# Patient Record
Sex: FEMALE | Race: WHITE | NOT HISPANIC OR LATINO | URBAN - METROPOLITAN AREA
[De-identification: names, ages, dates, MRNs, and addresses within clinical notes are randomized per-mention and may not be internally consistent; named-entity substitution may affect disease eponyms.]

---

## 2020-07-24 ENCOUNTER — INPATIENT (INPATIENT)
Facility: HOSPITAL | Age: 85
LOS: 5 days | Discharge: NURSING, REHAB OR LONG-TERM CARE | DRG: 481 | End: 2020-07-30
Attending: SURGERY | Admitting: SURGERY
Payer: MEDICARE

## 2020-07-24 VITALS — WEIGHT: 172.4 LBS

## 2020-07-24 DIAGNOSIS — S72.92XA UNSPECIFIED FRACTURE OF LEFT FEMUR, INITIAL ENCOUNTER FOR CLOSED FRACTURE: ICD-10-CM

## 2020-07-24 LAB
ABO RH CONFIRMATION: SIGNIFICANT CHANGE UP
ALBUMIN SERPL ELPH-MCNC: 3.2 G/DL — LOW (ref 3.3–5.2)
ALP SERPL-CCNC: 150 U/L — HIGH (ref 40–120)
ALT FLD-CCNC: 9 U/L — SIGNIFICANT CHANGE UP
AMPHET UR-MCNC: NEGATIVE — SIGNIFICANT CHANGE UP
ANION GAP SERPL CALC-SCNC: 16 MMOL/L — SIGNIFICANT CHANGE UP (ref 5–17)
APTT BLD: 30.2 SEC — SIGNIFICANT CHANGE UP (ref 27.5–35.5)
AST SERPL-CCNC: 15 U/L — SIGNIFICANT CHANGE UP
BARBITURATES UR SCN-MCNC: NEGATIVE — SIGNIFICANT CHANGE UP
BASOPHILS # BLD AUTO: 0.01 K/UL — SIGNIFICANT CHANGE UP (ref 0–0.2)
BASOPHILS NFR BLD AUTO: 0.1 % — SIGNIFICANT CHANGE UP (ref 0–2)
BENZODIAZ UR-MCNC: NEGATIVE — SIGNIFICANT CHANGE UP
BILIRUB SERPL-MCNC: 0.9 MG/DL — SIGNIFICANT CHANGE UP (ref 0.4–2)
BLD GP AB SCN SERPL QL: SIGNIFICANT CHANGE UP
BUN SERPL-MCNC: 23 MG/DL — HIGH (ref 8–20)
CALCIUM SERPL-MCNC: 8.9 MG/DL — SIGNIFICANT CHANGE UP (ref 8.6–10.2)
CHLORIDE SERPL-SCNC: 102 MMOL/L — SIGNIFICANT CHANGE UP (ref 98–107)
CO2 SERPL-SCNC: 22 MMOL/L — SIGNIFICANT CHANGE UP (ref 22–29)
COCAINE METAB.OTHER UR-MCNC: NEGATIVE — SIGNIFICANT CHANGE UP
CREAT SERPL-MCNC: 1.41 MG/DL — HIGH (ref 0.5–1.3)
EOSINOPHIL # BLD AUTO: 0.03 K/UL — SIGNIFICANT CHANGE UP (ref 0–0.5)
EOSINOPHIL NFR BLD AUTO: 0.2 % — SIGNIFICANT CHANGE UP (ref 0–6)
ETHANOL SERPL-MCNC: <10 MG/DL — SIGNIFICANT CHANGE UP
GLUCOSE SERPL-MCNC: 178 MG/DL — HIGH (ref 70–99)
HCT VFR BLD CALC: 41.6 % — SIGNIFICANT CHANGE UP (ref 34.5–45)
HGB BLD-MCNC: 13.9 G/DL — SIGNIFICANT CHANGE UP (ref 11.5–15.5)
IMM GRANULOCYTES NFR BLD AUTO: 0.4 % — SIGNIFICANT CHANGE UP (ref 0–1.5)
INR BLD: 1.02 RATIO — SIGNIFICANT CHANGE UP (ref 0.88–1.16)
LYMPHOCYTES # BLD AUTO: 1.14 K/UL — SIGNIFICANT CHANGE UP (ref 1–3.3)
LYMPHOCYTES # BLD AUTO: 8.3 % — LOW (ref 13–44)
MCHC RBC-ENTMCNC: 31.7 PG — SIGNIFICANT CHANGE UP (ref 27–34)
MCHC RBC-ENTMCNC: 33.4 GM/DL — SIGNIFICANT CHANGE UP (ref 32–36)
MCV RBC AUTO: 95 FL — SIGNIFICANT CHANGE UP (ref 80–100)
METHADONE UR-MCNC: NEGATIVE — SIGNIFICANT CHANGE UP
MONOCYTES # BLD AUTO: 0.6 K/UL — SIGNIFICANT CHANGE UP (ref 0–0.9)
MONOCYTES NFR BLD AUTO: 4.4 % — SIGNIFICANT CHANGE UP (ref 2–14)
NEUTROPHILS # BLD AUTO: 11.84 K/UL — HIGH (ref 1.8–7.4)
NEUTROPHILS NFR BLD AUTO: 86.6 % — HIGH (ref 43–77)
OPIATES UR-MCNC: NEGATIVE — SIGNIFICANT CHANGE UP
PCP SPEC-MCNC: SIGNIFICANT CHANGE UP
PCP UR-MCNC: NEGATIVE — SIGNIFICANT CHANGE UP
PLATELET # BLD AUTO: 208 K/UL — SIGNIFICANT CHANGE UP (ref 150–400)
POTASSIUM SERPL-MCNC: 3.8 MMOL/L — SIGNIFICANT CHANGE UP (ref 3.5–5.3)
POTASSIUM SERPL-SCNC: 3.8 MMOL/L — SIGNIFICANT CHANGE UP (ref 3.5–5.3)
PROT SERPL-MCNC: 5.5 G/DL — LOW (ref 6.6–8.7)
PROTHROM AB SERPL-ACNC: 11.8 SEC — SIGNIFICANT CHANGE UP (ref 10.6–13.6)
RBC # BLD: 4.38 M/UL — SIGNIFICANT CHANGE UP (ref 3.8–5.2)
RBC # FLD: 12.3 % — SIGNIFICANT CHANGE UP (ref 10.3–14.5)
SARS-COV-2 RNA SPEC QL NAA+PROBE: SIGNIFICANT CHANGE UP
SODIUM SERPL-SCNC: 140 MMOL/L — SIGNIFICANT CHANGE UP (ref 135–145)
THC UR QL: NEGATIVE — SIGNIFICANT CHANGE UP
WBC # BLD: 13.68 K/UL — HIGH (ref 3.8–10.5)
WBC # FLD AUTO: 13.68 K/UL — HIGH (ref 3.8–10.5)

## 2020-07-24 PROCEDURE — 71260 CT THORAX DX C+: CPT | Mod: 26

## 2020-07-24 PROCEDURE — 99221 1ST HOSP IP/OBS SF/LOW 40: CPT | Mod: 57

## 2020-07-24 PROCEDURE — 70450 CT HEAD/BRAIN W/O DYE: CPT | Mod: 26

## 2020-07-24 PROCEDURE — 99291 CRITICAL CARE FIRST HOUR: CPT | Mod: CS

## 2020-07-24 PROCEDURE — 71045 X-RAY EXAM CHEST 1 VIEW: CPT | Mod: 26

## 2020-07-24 PROCEDURE — 73502 X-RAY EXAM HIP UNI 2-3 VIEWS: CPT | Mod: 26,LT

## 2020-07-24 PROCEDURE — 99221 1ST HOSP IP/OBS SF/LOW 40: CPT | Mod: GC,AI

## 2020-07-24 PROCEDURE — 74177 CT ABD & PELVIS W/CONTRAST: CPT | Mod: 26

## 2020-07-24 PROCEDURE — 93010 ELECTROCARDIOGRAM REPORT: CPT

## 2020-07-24 PROCEDURE — 72125 CT NECK SPINE W/O DYE: CPT | Mod: 26

## 2020-07-24 PROCEDURE — 73552 X-RAY EXAM OF FEMUR 2/>: CPT | Mod: 26,LT

## 2020-07-24 PROCEDURE — 72170 X-RAY EXAM OF PELVIS: CPT | Mod: 26,59

## 2020-07-24 RX ORDER — CEFAZOLIN SODIUM 1 G
2000 VIAL (EA) INJECTION ONCE
Refills: 0 | Status: DISCONTINUED | OUTPATIENT
Start: 2020-07-26 | End: 2020-07-30

## 2020-07-24 RX ORDER — CLOPIDOGREL BISULFATE 75 MG/1
1 TABLET, FILM COATED ORAL
Qty: 0 | Refills: 0 | DISCHARGE

## 2020-07-24 RX ORDER — METOPROLOL TARTRATE 50 MG
1 TABLET ORAL
Qty: 0 | Refills: 0 | DISCHARGE

## 2020-07-24 RX ORDER — SIMVASTATIN 20 MG/1
1 TABLET, FILM COATED ORAL
Qty: 0 | Refills: 0 | DISCHARGE

## 2020-07-24 RX ORDER — SODIUM CHLORIDE 9 MG/ML
1000 INJECTION, SOLUTION INTRAVENOUS
Refills: 0 | Status: DISCONTINUED | OUTPATIENT
Start: 2020-07-24 | End: 2020-07-30

## 2020-07-24 RX ORDER — METOPROLOL TARTRATE 50 MG
50 TABLET ORAL EVERY 12 HOURS
Refills: 0 | Status: DISCONTINUED | OUTPATIENT
Start: 2020-07-24 | End: 2020-07-30

## 2020-07-24 RX ORDER — FUROSEMIDE 40 MG
1 TABLET ORAL
Qty: 0 | Refills: 0 | DISCHARGE

## 2020-07-24 NOTE — ED ADULT NURSE REASSESSMENT NOTE - NS ED NURSE REASSESS COMMENT FT1
Assumed pt care at this time. Pt resting comfortably in stretcher, A&Ox2 (u/k date), NAD noted, respirations even and nonlabored, NSR on monitor. Pt offers no complaints at this time. Assumed pt care at this time. Pt resting comfortably in stretcher, A&Ox2 (u/k date), NAD noted, respirations even and nonlabored, NSR on monitor. Pt offers no complaints at this time. Pt came to [facility] as Trauma B, s/t fall on plavix.

## 2020-07-24 NOTE — ED ADULT NURSE REASSESSMENT NOTE - NS ED NURSE REASSESS COMMENT FT1
spoke with daughter spoke with daughter reyna hernández [phone]. updated as to admit status, explained no visitors in ED. understood, will call [facility] after admit for further updates. spoke with daughter reyna hernández [phone]. updated as to admit status, explained no visitors in ED. understood, will call [facility] after admit for further updates. as per daughter, pt is a&ox2 at baseline, lives home with her son.

## 2020-07-24 NOTE — CONSULT NOTE ADULT - SUBJECTIVE AND OBJECTIVE BOX
Pt Name: GUSTABO DIGGS    MRN: [id number]      Patient is a 86y Female with past medical history of HTN, HLD, CAD (s/p stent) on plavix presenting to [facility] ED as code trauma status post ground level mechanical fall earlier this evening. Patient appears confused and unable to recall specifics of incident, repeatedly stating that her son would be able to answer questions. Patient does endorse left hip/groin pain. Patient denies hitting her head or LOC. Patient denies acute motor or sensory changes. Denies pain to left ankle, knee, right lower extremity or bilateral upper extremities. No additional complaints expressed.     REVIEW OF SYSTEMS    General: Alert, responsive, in NAD    Respiratory and Thorax: No difficulty breathing. No cough.  	   Cardiovascular:	No chest pain. No palpitations.    Gastrointestinal:	 No abdominal pain. No diarrhea.     Genitourinary: No dysuria. No bleeding.    Musculoskeletal: SEE HPI.    Neurological: No sensory or motor changes.     ROS is otherwise negative.    PAST MEDICAL & SURGICAL HISTORY:  PAST MEDICAL & SURGICAL HISTORY:    Allergies: No Known Allergies    Medications:     FAMILY HISTORY:  : non-contributory                            13.9   13.68 )-----------( 208      ( 24 Jul 2020 19:01 )             41.6       07-24    140  |  102  |  23.0<H>  ----------------------------<  178<H>  3.8   |  22.0  |  1.41<H>    Ca    8.9      24 Jul 2020 19:01    TPro  5.5<L>  /  Alb  3.2<L>  /  TBili  0.9  /  DBili  x   /  AST  15  /  ALT  9   /  AlkPhos  150<H>  07-24      Vital Signs Last 24 Hrs  T(C): --  T(F): --  HR: --  BP: --  BP(mean): --  RR: --  SpO2: --    Daily     Daily       PHYSICAL EXAM:      Appearance: Alert, responsive, in no acute distress.    Musculoskeletal:         Left Upper Extremity: Normal painless range of motion. No bony tenderness. No crepitus.        Right Upper Extremity: Normal painless range of motion. No bony tenderness. No crepitus.        Left Lower Extremity: shortened and externally rotated. Skin intact. No bleeding or lacerations noted. Tenderness and associated swelling noted throughout hip radiating into groin. No ecchymosis noted. Sensation to light touch grossly intact without deficit. Compartments soft supple throughout. +GC/TA/EHL/FHL. Dorsalis pedis pulse 2+. Capillary refill is less than 3 seconds.        Right Lower Extremity: Normal painless range of motion. No bony tenderness. No crepitus.     Imaging Studies: < from: Xray Hip 2-3 Views, Left (07.24.20 @ 19:49) >     EXAM:  XR HIP 2-3V LT                          PROCEDURE DATE:  07/24/2020          INTERPRETATION:  Left hip. Patient local trauma. 2 views.    There is mild left hip degeneration.    There is an intratrochanteric fracture with increased angulation at the fracture site and comminution of the greater lesser trochanters.    IMPRESSION: Fracture as above.    < end of copied text >      A/P:  Pt is a  86y Female with Left IT fracture as described above    PLAN:   * Case discussed with Dr. Gomez  * NPO after midnight for OR tomorrow pending medical/trauma/cardiology clearances  * IV fluids ordered and to start once NPO  * Pre-operative ABX ordered  * Pain control as clinically indicated  * Bed rest for now Pt Name: GUSTABO DIGGS    MRN: [id number]      Patient is a 86y Female with past medical history of HTN, HLD, CAD (s/p stent) on plavix presenting to [facility] ED as code trauma status post ground level mechanical fall earlier this evening. Patient appears confused and unable to recall specifics of incident, repeatedly stating that her son would be able to answer questions. Patient does endorse left hip/groin pain. Patient denies hitting her head or LOC. Patient denies acute motor or sensory changes. Denies pain to left ankle, knee, right lower extremity or bilateral upper extremities. No additional complaints expressed.     REVIEW OF SYSTEMS    General: Alert, responsive, in NAD    Respiratory and Thorax: No difficulty breathing. No cough.  	   Cardiovascular:	No chest pain. No palpitations.    Gastrointestinal:	 No abdominal pain. No diarrhea.     Genitourinary: No dysuria. No bleeding.    Musculoskeletal: SEE HPI.    Neurological: No sensory or motor changes.     ROS is otherwise negative.    PAST MEDICAL & SURGICAL HISTORY:  PAST MEDICAL & SURGICAL HISTORY:    Allergies: No Known Allergies    Medications:     FAMILY HISTORY:  : non-contributory                            13.9   13.68 )-----------( 208      ( 24 Jul 2020 19:01 )             41.6       07-24    140  |  102  |  23.0<H>  ----------------------------<  178<H>  3.8   |  22.0  |  1.41<H>    Ca    8.9      24 Jul 2020 19:01    TPro  5.5<L>  /  Alb  3.2<L>  /  TBili  0.9  /  DBili  x   /  AST  15  /  ALT  9   /  AlkPhos  150<H>  07-24      Vital Signs Last 24 Hrs  T(C): --  T(F): --  HR: --  BP: --  BP(mean): --  RR: --  SpO2: --    Daily     Daily       PHYSICAL EXAM:      Appearance: Alert, responsive, in no acute distress.    Musculoskeletal:         Left Upper Extremity: Normal painless range of motion. No bony tenderness. No crepitus.        Right Upper Extremity: Normal painless range of motion. No bony tenderness. No crepitus.        Left Lower Extremity: shortened and externally rotated. Skin intact. No bleeding or lacerations noted. Tenderness and associated swelling noted throughout hip radiating into groin. No ecchymosis noted. Sensation to light touch grossly intact without deficit. Compartments soft supple throughout. +GC/TA/EHL/FHL. Dorsalis pedis pulse 2+. Capillary refill is less than 3 seconds.        Right Lower Extremity: Normal painless range of motion. No bony tenderness. No crepitus.     Imaging Studies: < from: Xray Hip 2-3 Views, Left (07.24.20 @ 19:49) >     EXAM:  XR HIP 2-3V LT                          PROCEDURE DATE:  07/24/2020          INTERPRETATION:  Left hip. Patient local trauma. 2 views.    There is mild left hip degeneration.    There is an intratrochanteric fracture with increased angulation at the fracture site and comminution of the greater lesser trochanters.    IMPRESSION: Fracture as above.    < end of copied text >      A/P:  Pt is a  86y Female with Left IT fracture as described above    PLAN:   * Case discussed with Dr. Gomez. Patient needs a cephallomedullary nail  * NPO after midnight for OR tomorrow pending medical/trauma/cardiology clearances  * IV fluids ordered and to start once NPO  * Pre-operative ABX ordered  * Pain control as clinically indicated  * Bed rest for now

## 2020-07-24 NOTE — ED ADULT TRIAGE NOTE - CHIEF COMPLAINT QUOTE
Pt fell from chair at home, was on floor for ~2.5 hours, obvious deformity to left leg, on plavix, AOx3, denies hitting head, trauma B activated

## 2020-07-24 NOTE — ED PROVIDER NOTE - OBJECTIVE STATEMENT
Pt is a 87 y/o F w/PMHx CAD(stents) on plavix presents c/o fall.  Pt fell from her chair onto the floor and was down about 2.5 hours.  Pt denies hitting head or LOC. labor/delivery

## 2020-07-24 NOTE — H&P ADULT - HISTORY OF PRESENT ILLNESS
86y Female BIB EMS who had a ground level fall about three hours prior to arrival. Patient thought pain would get better over time but instead it got worse. Patient denies loss of consciousness.    Patient denies fevers/chills, denies lightheadedness/dizziness, denies SOB/chest pain, denies nausea/vomiting, denies constipation/diarrhea.  ***    A airway intact, speaking full sentences  B equal breath sounds bilaterally  C radial/DP/femoral pulses intact bilaterally   D GCS15 [id number], moving all fours, no focal deficits, pupils R 3mm reactive/L 3mm reactive  E patient fully exposed, provided warm blankets, L hip remarkable for shortened and internally rotated L hip    CXR no fracture or hemopneumothorax  FAST negative    Secondary survey remarkable for shortened and internally rotated L hip    ROS: 10-system review is otherwise negative except HPI above.      PAST MEDICAL & SURGICAL HISTORY: HTN, CAD, HLD    PSH: hysterectomy    FAMILY HISTORY:    [] Family history not pertinent as reviewed with the patient and family    SOCIAL HISTORY:  ***    ALLERGIES: No Known Allergies      HOME MEDICATIONS:   Metoprolol 50 BID  KCl 10 meq BID  Plavix 75  Simvastatin 20  Lasix 40 Qd    --------------------------------------------------------------------------------------------    PHYSICAL EXAM:   General: NAD  Neuro: A+Ox3  HEENT: NC/AT, EOMI  Cardio: RRR, nml S1/S2  Resp: Good effort, CTA b/l  Thorax: No chest wall tenderness  Breast: No lesions/masses, no drainage  GI/Abd: Soft, NT/ND, no rebound/guarding, no masses palpated  Vascular: All 4 extremities warm.  Skin: Intact, no breakdown  Pelvis: unstable, shortened and internally rotated L leg  Musculoskeletal: no spinal tenderness or stepoffs, moving all 4 extremeties  --------------------------------------------------------------------------------------------    LABS                 13.9   13.68  )----------(  208       ( 24 Jul 2020 19:01 )               41.6      140    |  102    |  23.0   ----------------------------<  178        ( 24 Jul 2020 19:01 )  3.8     |  22.0   |  1.41     Ca    8.9        ( 24 Jul 2020 19:01 )    TPro  5.5    /  Alb  3.2    /  TBili  0.9    /  DBili  x      /  AST  15     /  ALT  9      /  AlkPhos  150    ( 24 Jul 2020 19:01 )    LIVER FUNCTIONS - ( 24 Jul 2020 19:01 )  Alb: 3.2 g/dL / Pro: 5.5 g/dL / ALK PHOS: 150 U/L / ALT: 9 U/L / AST: 15 U/L / GGT: x           PT/INR -  11.8 sec / 1.02 ratio   ( 24 Jul 2020 19:01 )       PTT -  30.2 sec   ( 24 Jul 2020 19:01 )  --------------------------------------------------------------------------------------------  IMAGING  XR Pelvis:     CT C/A/P:      ASSESSMENT: Patient is a 86y old female s/p ground level fall and     PLAN:    - tertiary exam  - f/u ortho recs  - f/u CT A/P  - f/u XR pelvis  - pain control  - Patient seen/examined with attending.  - Plan to be discussed with Attending, Dr. Jaimes 86y Female BIB EMS who had a ground level fall about three hours prior to arrival. Patient thought pain would get better over time but instead it got worse. Patient denies loss of consciousness.    Patient denies fevers/chills, denies lightheadedness/dizziness, denies SOB/chest pain, denies nausea/vomiting, denies constipation/diarrhea.  ***    A airway intact, speaking full sentences  B equal breath sounds bilaterally  C radial/DP/femoral pulses intact bilaterally   D GCS15 E4V5M6, moving all fours, no focal deficits, pupils R 3mm reactive/L 3mm reactive  E patient fully exposed, provided warm blankets, L hip remarkable for shortened and internally rotated L hip    CXR no fracture or hemopneumothorax  FAST negative    Secondary survey remarkable for shortened and internally rotated L hip    ROS: 10-system review is otherwise negative except HPI above.      PAST MEDICAL & SURGICAL HISTORY: HTN, CAD, HLD    PSH: hysterectomy    FAMILY HISTORY:    [] Family history not pertinent as reviewed with the patient and family    SOCIAL HISTORY:  ***    ALLERGIES: No Known Allergies      HOME MEDICATIONS:   Metoprolol 50 BID  KCl 10 meq BID  Plavix 75  Simvastatin 20  Lasix 40 Qd    --------------------------------------------------------------------------------------------    PHYSICAL EXAM:   General: NAD  Neuro: A+Ox3  HEENT: NC/AT, EOMI  Cardio: RRR, nml S1/S2  Resp: Good effort, CTA b/l  Thorax: No chest wall tenderness  Breast: No lesions/masses, no drainage  GI/Abd: Soft, NT/ND, no rebound/guarding, no masses palpated  Vascular: All 4 extremities warm.  Skin: Intact, no breakdown  Pelvis: unstable, shortened and internally rotated L leg  Musculoskeletal: no spinal tenderness or stepoffs, moving all 4 extremities  : fecal soiling in underwear  --------------------------------------------------------------------------------------------    LABS                 13.9   13.68  )----------(  208       ( 24 Jul 2020 19:01 )               41.6      140    |  102    |  23.0   ----------------------------<  178        ( 24 Jul 2020 19:01 )  3.8     |  22.0   |  1.41     Ca    8.9        ( 24 Jul 2020 19:01 )    TPro  5.5    /  Alb  3.2    /  TBili  0.9    /  DBili  x      /  AST  15     /  ALT  9      /  AlkPhos  150    ( 24 Jul 2020 19:01 )    LIVER FUNCTIONS - ( 24 Jul 2020 19:01 )  Alb: 3.2 g/dL / Pro: 5.5 g/dL / ALK PHOS: 150 U/L / ALT: 9 U/L / AST: 15 U/L / GGT: x           PT/INR -  11.8 sec / 1.02 ratio   ( 24 Jul 2020 19:01 )       PTT -  30.2 sec   ( 24 Jul 2020 19:01 )  --------------------------------------------------------------------------------------------  IMAGING  XR Pelvis:     CT C/A/P:      ASSESSMENT: Patient is a 86y old female s/p ground level fall and     PLAN:    - tertiary exam  - f/u ortho recs  - f/u CT A/P  - f/u XR pelvis  - pain control  - Patient seen/examined with attending.  - Plan to be discussed with Attending, Dr. Jaimes 86y Female BIB EMS who had a ground level fall about three hours prior to arrival. Patient thought pain would get better over time but instead it got worse. Patient denies loss of consciousness.    Patient denies fevers/chills, denies lightheadedness/dizziness, denies SOB/chest pain, denies nausea/vomiting, denies constipation/diarrhea.     A airway intact, speaking full sentences  B equal breath sounds bilaterally  C radial/DP/femoral pulses intact bilaterally   D GCS15 E4V5M6, moving all fours, no focal deficits, pupils R 3mm reactive/L 3mm reactive  E patient fully exposed, provided warm blankets, L hip remarkable for shortened and externally rotated L hip    CXR no fracture or hemopneumothorax  FAST negative    Secondary survey remarkable for shortened and externally rotated L hip    ROS: 10-system review is otherwise negative except HPI above.      PAST MEDICAL & SURGICAL HISTORY: HTN, CAD, HLD    PSH: hysterectomy    FAMILY HISTORY:    [] Family history not pertinent as reviewed with the patient and family    SOCIAL HISTORY:  ***    ALLERGIES: No Known Allergies      HOME MEDICATIONS:   Metoprolol 50 BID  KCl 10 meq BID  Plavix 75  Simvastatin 20  Lasix 40 Qd    --------------------------------------------------------------------------------------------    PHYSICAL EXAM:   General: NAD  Neuro: A+Ox3  HEENT: NC/AT, EOMI  Cardio: RRR, nml S1/S2  Resp: Good effort, CTA b/l  Thorax: No chest wall tenderness  Breast: No lesions/masses, no drainage  GI/Abd: Soft, NT/ND, no rebound/guarding, no masses palpated  Vascular: All 4 extremities warm.  Skin: Intact, no breakdown  Pelvis: unstable, shortened and externally rotated L leg  Musculoskeletal: no spinal tenderness or stepoffs, moving all 4 extremities  : fecal soiling in underwear  --------------------------------------------------------------------------------------------    LABS                 13.9   13.68  )----------(  208       ( 24 Jul 2020 19:01 )               41.6      140    |  102    |  23.0   ----------------------------<  178        ( 24 Jul 2020 19:01 )  3.8     |  22.0   |  1.41     Ca    8.9        ( 24 Jul 2020 19:01 )    TPro  5.5    /  Alb  3.2    /  TBili  0.9    /  DBili  x      /  AST  15     /  ALT  9      /  AlkPhos  150    ( 24 Jul 2020 19:01 )    LIVER FUNCTIONS - ( 24 Jul 2020 19:01 )  Alb: 3.2 g/dL / Pro: 5.5 g/dL / ALK PHOS: 150 U/L / ALT: 9 U/L / AST: 15 U/L / GGT: x           PT/INR -  11.8 sec / 1.02 ratio   ( 24 Jul 2020 19:01 )       PTT -  30.2 sec   ( 24 Jul 2020 19:01 )  --------------------------------------------------------------------------------------------  IMAGING  XR Pelvis:     CT C/A/P:      ASSESSMENT: Patient is a 86y old female s/p ground level fall and     PLAN:    - tertiary exam  - f/u ortho recs  - f/u CT A/P  - f/u XR pelvis  - pain control  - Patient seen/examined with attending.  - Plan to be discussed with Attending, Dr. Jaimes

## 2020-07-24 NOTE — PROGRESS NOTE ADULT - SUBJECTIVE AND OBJECTIVE BOX
Tertiary Trauma Survey (TTS)    Date of TTS: 07-24-20 @ 23:19                             Admit Date: 07-24-20 @ 19:43      Trauma Activation:B      Subjective / 24 hour events: Patient was examined and seen at bedside. She is lying uncomfortably and was in mild distress due to her L hip fx. Patient denies acute onset of pain elsewhere.     Vital Signs Last 24 Hrs  T(C): 36.8 (24 Jul 2020 22:25), Max: 36.8 (24 Jul 2020 22:25)  T(F): 98.3 (24 Jul 2020 22:25), Max: 98.3 (24 Jul 2020 22:25)  HR: 103 (24 Jul 2020 22:25) (103 - 103)  BP: 112/65 (24 Jul 2020 22:25) (112/65 - 112/65)  BP(mean): --  RR: 18 (24 Jul 2020 22:25) (18 - 18)  SpO2: 100% (24 Jul 2020 22:25) (100% - 100%)    Physical Exam:    Neuro: [ ] non focal neurological exam [ ] Focal Neurological deficits noted to be:     HEENT: [ ] Normo-cephalic/atraumatic  [ ] abnormalities noted to be:    Pulm/Chest:  [ ] CTA b/l  [ ] chest wall non tender  [ ] abnormalities noted to be:    Cardiac: [ ] S1S2, sinus rhythm  [ ] abnormalities noted to be:     GI / Abdomen: [ ] Soft, non-tender, non-distended [ ] abnormalities noted to be:    Musculoskeletal / Extremities: [ ]normal active ROM  [ ]  abnormalities noted to be:    Integumentary: [ ] Skin intact [ ] Warm [ ] Dry [ ]abnormalities noted to be:    Vascular: [ ] 2+ palpable distal pulses  [ ] SHAY:       [ ] abnormalities noted to be:      List Injuries Identified to Date:    List Operative and Interventional Radiological Procedures:     Consults (Date):  [x] Orthopedics      RADIOLOGICAL FINDINGS REVIEW:  CXR:    Pelvis Films:     C-Spine Films:    T/L/S Spine Films:    Extremity Films:    Head CT:    C-Spine CT:    Neck CT:    Chest CT:    ABD/Pelvis CT:    Other:

## 2020-07-24 NOTE — ED PROVIDER NOTE - ATTENDING CONTRIBUTION TO CARE
I performed a face to face history and physical exam of the patient and discussed their management with the resident/ACP. I reviewed the resident/ACP's note and agree with the documented findings and plan of care.    Pt with fall at home and complaining of L hip pain from the fall. uncertain if she hit her head.  trauma B activation.    physical - tachy irregular.  ctab abd - soft, nt. no edema. no rash.    plan - labs and imaging reviewed.  Pt with L hip fx. ortho consulted. trauma to admit.

## 2020-07-24 NOTE — H&P ADULT - ATTENDING COMMENTS
Pt seen and examined by me  agree with findings  outward signs neglect  IT fx  Head, C spine CT neg for acute injury  CT C/A/P-thyroid nodule, pneumobilia (prior cholecystectomy), mass concerning in rectal vault  will need further eval for incidental findings

## 2020-07-25 ENCOUNTER — TRANSCRIPTION ENCOUNTER (OUTPATIENT)
Age: 85
End: 2020-07-25

## 2020-07-25 DIAGNOSIS — R93.3 ABNORMAL FINDINGS ON DIAGNOSTIC IMAGING OF OTHER PARTS OF DIGESTIVE TRACT: ICD-10-CM

## 2020-07-25 DIAGNOSIS — S72.92XA UNSPECIFIED FRACTURE OF LEFT FEMUR, INITIAL ENCOUNTER FOR CLOSED FRACTURE: ICD-10-CM

## 2020-07-25 LAB
ANION GAP SERPL CALC-SCNC: 16 MMOL/L — SIGNIFICANT CHANGE UP (ref 5–17)
BASOPHILS # BLD AUTO: 0.02 K/UL — SIGNIFICANT CHANGE UP (ref 0–0.2)
BASOPHILS NFR BLD AUTO: 0.2 % — SIGNIFICANT CHANGE UP (ref 0–2)
BUN SERPL-MCNC: 25 MG/DL — HIGH (ref 8–20)
CALCIUM SERPL-MCNC: 8.4 MG/DL — LOW (ref 8.6–10.2)
CHLORIDE SERPL-SCNC: 103 MMOL/L — SIGNIFICANT CHANGE UP (ref 98–107)
CO2 SERPL-SCNC: 19 MMOL/L — LOW (ref 22–29)
CREAT SERPL-MCNC: 1.18 MG/DL — SIGNIFICANT CHANGE UP (ref 0.5–1.3)
EOSINOPHIL # BLD AUTO: 0 K/UL — SIGNIFICANT CHANGE UP (ref 0–0.5)
EOSINOPHIL NFR BLD AUTO: 0 % — SIGNIFICANT CHANGE UP (ref 0–6)
GLUCOSE SERPL-MCNC: 190 MG/DL — HIGH (ref 70–99)
HCT VFR BLD CALC: 33.8 % — LOW (ref 34.5–45)
HGB BLD-MCNC: 11.2 G/DL — LOW (ref 11.5–15.5)
IMM GRANULOCYTES NFR BLD AUTO: 0.6 % — SIGNIFICANT CHANGE UP (ref 0–1.5)
LYMPHOCYTES # BLD AUTO: 1.14 K/UL — SIGNIFICANT CHANGE UP (ref 1–3.3)
LYMPHOCYTES # BLD AUTO: 8.6 % — LOW (ref 13–44)
MAGNESIUM SERPL-MCNC: 1.9 MG/DL — SIGNIFICANT CHANGE UP (ref 1.6–2.6)
MCHC RBC-ENTMCNC: 31.3 PG — SIGNIFICANT CHANGE UP (ref 27–34)
MCHC RBC-ENTMCNC: 33.1 GM/DL — SIGNIFICANT CHANGE UP (ref 32–36)
MCV RBC AUTO: 94.4 FL — SIGNIFICANT CHANGE UP (ref 80–100)
MONOCYTES # BLD AUTO: 0.81 K/UL — SIGNIFICANT CHANGE UP (ref 0–0.9)
MONOCYTES NFR BLD AUTO: 6.1 % — SIGNIFICANT CHANGE UP (ref 2–14)
NEUTROPHILS # BLD AUTO: 11.27 K/UL — HIGH (ref 1.8–7.4)
NEUTROPHILS NFR BLD AUTO: 84.5 % — HIGH (ref 43–77)
NT-PROBNP SERPL-SCNC: 2827 PG/ML — HIGH (ref 0–300)
PLATELET # BLD AUTO: 181 K/UL — SIGNIFICANT CHANGE UP (ref 150–400)
POTASSIUM SERPL-MCNC: 3.8 MMOL/L — SIGNIFICANT CHANGE UP (ref 3.5–5.3)
POTASSIUM SERPL-SCNC: 3.8 MMOL/L — SIGNIFICANT CHANGE UP (ref 3.5–5.3)
RBC # BLD: 3.58 M/UL — LOW (ref 3.8–5.2)
RBC # FLD: 12.7 % — SIGNIFICANT CHANGE UP (ref 10.3–14.5)
SARS-COV-2 IGG SERPL QL IA: NEGATIVE — SIGNIFICANT CHANGE UP
SARS-COV-2 IGM SERPL IA-ACNC: <0.1 INDEX — SIGNIFICANT CHANGE UP
SODIUM SERPL-SCNC: 138 MMOL/L — SIGNIFICANT CHANGE UP (ref 135–145)
T3 SERPL-MCNC: 67 NG/DL — LOW (ref 80–200)
T4 AB SER-ACNC: 5.4 UG/DL — SIGNIFICANT CHANGE UP (ref 4.5–12)
TROPONIN T SERPL-MCNC: <0.01 NG/ML — SIGNIFICANT CHANGE UP (ref 0–0.06)
TSH SERPL-MCNC: 0.93 UIU/ML — SIGNIFICANT CHANGE UP (ref 0.27–4.2)
WBC # BLD: 13.32 K/UL — HIGH (ref 3.8–10.5)
WBC # FLD AUTO: 13.32 K/UL — HIGH (ref 3.8–10.5)

## 2020-07-25 PROCEDURE — 99223 1ST HOSP IP/OBS HIGH 75: CPT

## 2020-07-25 PROCEDURE — 93306 TTE W/DOPPLER COMPLETE: CPT | Mod: 26

## 2020-07-25 PROCEDURE — 99233 SBSQ HOSP IP/OBS HIGH 50: CPT

## 2020-07-25 PROCEDURE — 99231 SBSQ HOSP IP/OBS SF/LOW 25: CPT

## 2020-07-25 RX ORDER — SENNA PLUS 8.6 MG/1
2 TABLET ORAL AT BEDTIME
Refills: 0 | Status: DISCONTINUED | OUTPATIENT
Start: 2020-07-25 | End: 2020-07-30

## 2020-07-25 RX ORDER — METOPROLOL TARTRATE 50 MG
5 TABLET ORAL ONCE
Refills: 0 | Status: COMPLETED | OUTPATIENT
Start: 2020-07-25 | End: 2020-07-25

## 2020-07-25 RX ORDER — ENOXAPARIN SODIUM 100 MG/ML
40 INJECTION SUBCUTANEOUS DAILY
Refills: 0 | Status: DISCONTINUED | OUTPATIENT
Start: 2020-07-25 | End: 2020-07-26

## 2020-07-25 RX ORDER — POLYETHYLENE GLYCOL 3350 17 G/17G
17 POWDER, FOR SOLUTION ORAL DAILY
Refills: 0 | Status: DISCONTINUED | OUTPATIENT
Start: 2020-07-25 | End: 2020-07-30

## 2020-07-25 RX ORDER — CLOPIDOGREL BISULFATE 75 MG/1
75 TABLET, FILM COATED ORAL DAILY
Refills: 0 | Status: DISCONTINUED | OUTPATIENT
Start: 2020-07-25 | End: 2020-07-25

## 2020-07-25 RX ORDER — POTASSIUM CHLORIDE 20 MEQ
20 PACKET (EA) ORAL ONCE
Refills: 0 | Status: COMPLETED | OUTPATIENT
Start: 2020-07-25 | End: 2020-07-25

## 2020-07-25 RX ORDER — HYDROMORPHONE HYDROCHLORIDE 2 MG/ML
0.5 INJECTION INTRAMUSCULAR; INTRAVENOUS; SUBCUTANEOUS EVERY 4 HOURS
Refills: 0 | Status: DISCONTINUED | OUTPATIENT
Start: 2020-07-25 | End: 2020-07-26

## 2020-07-25 RX ORDER — ACETAMINOPHEN 500 MG
650 TABLET ORAL EVERY 6 HOURS
Refills: 0 | Status: DISCONTINUED | OUTPATIENT
Start: 2020-07-25 | End: 2020-07-28

## 2020-07-25 RX ORDER — METOPROLOL TARTRATE 50 MG
25 TABLET ORAL ONCE
Refills: 0 | Status: COMPLETED | OUTPATIENT
Start: 2020-07-25 | End: 2020-07-25

## 2020-07-25 RX ORDER — ONDANSETRON 8 MG/1
4 TABLET, FILM COATED ORAL EVERY 6 HOURS
Refills: 0 | Status: DISCONTINUED | OUTPATIENT
Start: 2020-07-25 | End: 2020-07-30

## 2020-07-25 RX ORDER — SODIUM CHLORIDE 9 MG/ML
250 INJECTION INTRAMUSCULAR; INTRAVENOUS; SUBCUTANEOUS ONCE
Refills: 0 | Status: COMPLETED | OUTPATIENT
Start: 2020-07-25 | End: 2020-07-25

## 2020-07-25 RX ADMIN — Medication 650 MILLIGRAM(S): at 18:43

## 2020-07-25 RX ADMIN — Medication 650 MILLIGRAM(S): at 05:22

## 2020-07-25 RX ADMIN — Medication 650 MILLIGRAM(S): at 17:58

## 2020-07-25 RX ADMIN — Medication 5 MILLIGRAM(S): at 01:36

## 2020-07-25 RX ADMIN — Medication 20 MILLIEQUIVALENT(S): at 17:58

## 2020-07-25 RX ADMIN — SODIUM CHLORIDE 250 MILLILITER(S): 9 INJECTION INTRAMUSCULAR; INTRAVENOUS; SUBCUTANEOUS at 13:34

## 2020-07-25 RX ADMIN — Medication 50 MILLIGRAM(S): at 05:22

## 2020-07-25 RX ADMIN — Medication 650 MILLIGRAM(S): at 14:12

## 2020-07-25 RX ADMIN — Medication 650 MILLIGRAM(S): at 05:24

## 2020-07-25 RX ADMIN — Medication 25 MILLIGRAM(S): at 01:36

## 2020-07-25 RX ADMIN — Medication 650 MILLIGRAM(S): at 13:35

## 2020-07-25 RX ADMIN — Medication 50 MILLIGRAM(S): at 17:58

## 2020-07-25 RX ADMIN — ENOXAPARIN SODIUM 40 MILLIGRAM(S): 100 INJECTION SUBCUTANEOUS at 17:58

## 2020-07-25 NOTE — CONSULT NOTE ADULT - ASSESSMENT
Pt is a 85 y/o female with medical history of HTN, HLD, CAD s/p stent on Plavix, dementia, who presents to [facility]-ED s/p fall. Pt is cognitively impaired and could not contribute to HPI. Attempted to call pt son (POA) who lives in first floor apartment with mother- no answer, called pt daughter Ginny who stays upstairs from patient. Ginny states that as per brother, he was in the kitchen when he heard patient fall onto coffee table. Pt did not have LOC when found. Pt states that she was in the living room and tried to stand up and she fell hitting the back of her head. Pt was BIBA to [facility]-ED where CT head showed no acute bleeding, CT Chest- Comminuted left intertrochanteric fracture with displaced fragments. No acute intrathoracic or abdominal injury visualized.   Lobular wall thickening in the distal sigmoid/upper rectum which is highly concerning for neoplasm, Xray- intratrochanteric fracture with increased angulation at the fracture site and comminution of the greater lesser trochanters. Plan for surgical intervention of hip today. Upon assessment, pt presents with confusion (which is baseline according to pt daughter). Pt denies chest pain, palpitations, SOB, N/V/D, fevers, chills, cough, dizziness.       Cardiac Risk Stratification  -ECG-Afib HR @ 119 with occasional PVCs  -Echo-pending reading  -On tele, pt Afib seems mostly rate controlled with current medications  -Awaiting results of echo  -Without knowing pt present EF, RCRI risk score -1, Class II Risk, 0.9% Risk of Major Cardiac Event  -Pt is moderate risk of cardiac complication for intermediate risk surgery  - If No active chest pain, evidence of ischemia, decompensated CHF, significant valvular abnormality, or unstable arrhythmia, then there no absolute cardiac contraindication to the planned surgical procedure- PENDING ECHO RESULT    Preliminary evaluation, please await complete evaluation by Dr. Pack Pt is a 87 y/o female with medical history of HTN, HLD, CAD s/p stent on Plavix, dementia, who presents to [facility]-ED s/p fall. Pt is cognitively impaired and could not contribute to HPI. Attempted to call pt son (POA) who lives in first floor apartment with mother- no answer, called pt daughter Ginny who stays upstairs from patient. Ginny states that as per brother, he was in the kitchen when he heard patient fall onto coffee table. Pt did not have LOC when found. Pt states that she was in the living room and tried to stand up and she fell hitting the back of her head. Pt was BIBA to [facility]-ED where CT head showed no acute bleeding, CT Chest- Comminuted left intertrochanteric fracture with displaced fragments. No acute intrathoracic or abdominal injury visualized.   Lobular wall thickening in the distal sigmoid/upper rectum which is highly concerning for neoplasm, Xray- intratrochanteric fracture with increased angulation at the fracture site and comminution of the greater lesser trochanters. Plan for surgical intervention of hip today. Upon assessment, pt presents with confusion (which is baseline according to pt daughter). Pt denies chest pain, palpitations, SOB, N/V/D, fevers, chills, cough, dizziness.       Cardiac Risk Stratification  -ECG-Afib HR @ 119 with occasional PVCs  -On tele, pt Afib seems mostly rate controlled with current medications  -Echo- EF 60-70%, grade I diastolic dysfx., severely enlarged left atirum, moderate TR  -Without knowing pt present EF, RCRI risk score -1, Class II Risk, 0.9% Risk of Major Cardiac Event  -Pt is moderate risk of cardiac complication for intermediate risk surgery  -STAT Trop and BNP  -250cc saline bolus  - IF results of Trop, BNP are WNL, If No active chest pain, evidence of ischemia, decompensated CHF, significant valvular abnormality, or unstable arrhythmia, then there no absolute cardiac contraindication to the planned surgical procedure  -Please have patient f/u with PCP concerning adrenal and thyroid nodule

## 2020-07-25 NOTE — PROGRESS NOTE ADULT - SUBJECTIVE AND OBJECTIVE BOX
INTERVAL HPI/OVERNIGHT EVENTS:  Patient was seen and examined at bedside. Patient is lying in mild distress due to L intertrochanteric fx. Patient is aware that she is in a hospital but unsure of which hospital she is at. Patient is also unsure about her medical history and patient's son was unable to be reached by phone so will try again in the morning. When prompted again regarding the nature of her injury, patient was unable to recollect how she fell. Patient denies n/v, cp/sob, fevers, chills.    MEDICATIONS  (STANDING):  lactated ringers. 1000 milliLiter(s) (75 mL/Hr) IV Continuous <Continuous>  metoprolol tartrate 50 milliGRAM(s) Oral every 12 hours    MEDICATIONS  (PRN):      Vital Signs Last 24 Hrs  T(C): 36.8 (24 Jul 2020 22:25), Max: 36.8 (24 Jul 2020 22:25)  T(F): 98.3 (24 Jul 2020 22:25), Max: 98.3 (24 Jul 2020 22:25)  HR: 103 (24 Jul 2020 22:25) (103 - 103)  BP: 112/65 (24 Jul 2020 22:25) (112/65 - 112/65)  BP(mean): --  RR: 18 (24 Jul 2020 22:25) (18 - 18)  SpO2: 100% (24 Jul 2020 22:25) (100% - 100%)    Physical Exam:  Gen: mildly in distress due to L intertrochanteric fx, lying in bed  Respiratory: Breath Sounds equal & CTA bilaterally, no accessory muscle use  Cardiovascular: Regular rate & rhythm, normal S1, S2  Gastrointestinal: Soft, non-tender, nondistended  Vascular: Equal and normal pulses: 2+ peripheral pulses throughout  Musculoskeletal: L hip shorter and internally rotated  ECHO: normal sphincter tone, no gross blood observed, no masses palpated      I&O's Detail      LABS:                        13.9   13.68 )-----------( 208      ( 24 Jul 2020 19:01 )             41.6     07-24    140  |  102  |  23.0<H>  ----------------------------<  178<H>  3.8   |  22.0  |  1.41<H>    Ca    8.9      24 Jul 2020 19:01    TPro  5.5<L>  /  Alb  3.2<L>  /  TBili  0.9  /  DBili  x   /  AST  15  /  ALT  9   /  AlkPhos  150<H>  07-24    PT/INR - ( 24 Jul 2020 19:01 )   PT: 11.8 sec;   INR: 1.02 ratio         PTT - ( 24 Jul 2020 19:01 )  PTT:30.2 sec      Assessment;  Patient is an 87 yo F who presented to the ED s/p ground level   fall.    Plan:  - f/u ortho recs  - f/u cardiology recs/clearance for OR 7/25  - pain control  - f/u cancer markers INTERVAL HPI/OVERNIGHT EVENTS:  Patient was seen and examined at bedside. Patient is lying in mild distress due to L intertrochanteric fx. Patient is aware that she is in a hospital but unsure of which hospital she is at. Patient is also unsure about her medical history and patient's son was unable to be reached by phone so will try again in the morning. When prompted again regarding the nature of her injury, patient was unable to recollect how she fell. Patient denies n/v, cp/sob, fevers, chills.    MEDICATIONS  (STANDING):  lactated ringers. 1000 milliLiter(s) (75 mL/Hr) IV Continuous <Continuous>  metoprolol tartrate 50 milliGRAM(s) Oral every 12 hours    MEDICATIONS  (PRN):      Vital Signs Last 24 Hrs  T(C): 36.8 (24 Jul 2020 22:25), Max: 36.8 (24 Jul 2020 22:25)  T(F): 98.3 (24 Jul 2020 22:25), Max: 98.3 (24 Jul 2020 22:25)  HR: 103 (24 Jul 2020 22:25) (103 - 103)  BP: 112/65 (24 Jul 2020 22:25) (112/65 - 112/65)  BP(mean): --  RR: 18 (24 Jul 2020 22:25) (18 - 18)  SpO2: 100% (24 Jul 2020 22:25) (100% - 100%)    Physical Exam:  Gen: mildly in distress due to L intertrochanteric fx, lying in bed  Respiratory: Breath Sounds equal & CTA bilaterally, no accessory muscle use  Cardiovascular: Regular rate & rhythm, normal S1, S2  Gastrointestinal: Soft, non-tender, nondistended  Vascular: Equal and normal pulses: 2+ peripheral pulses throughout  Musculoskeletal: L hip shorter and externally rotated  ECHO: normal sphincter tone, no gross blood observed, no masses palpated      I&O's Detail      LABS:                        13.9   13.68 )-----------( 208      ( 24 Jul 2020 19:01 )             41.6     07-24    140  |  102  |  23.0<H>  ----------------------------<  178<H>  3.8   |  22.0  |  1.41<H>    Ca    8.9      24 Jul 2020 19:01    TPro  5.5<L>  /  Alb  3.2<L>  /  TBili  0.9  /  DBili  x   /  AST  15  /  ALT  9   /  AlkPhos  150<H>  07-24    PT/INR - ( 24 Jul 2020 19:01 )   PT: 11.8 sec;   INR: 1.02 ratio         PTT - ( 24 Jul 2020 19:01 )  PTT:30.2 sec      Assessment;  Patient is an 87 yo F who presented to the ED s/p ground level   fall.    Plan:  - f/u ortho recs  - f/u cardiology recs/clearance for OR 7/25  - pain control  - f/u cancer markers

## 2020-07-25 NOTE — CONSULT NOTE ADULT - SUBJECTIVE AND OBJECTIVE BOX
Patient is a 86y old  Female who presents with a chief complaint of ground level fall, obvious deformity in L hip (25 Jul 2020 00:43)      HPI:  86y Female BIB EMS who had a ground level fall about three hours prior to arrival. Patient thought pain would get better over time but instead it got worse. Patient denies loss of consciousness.  Patient denies fevers/chills, denies lightheadedness/dizziness, denies SOB/chest pain, denies nausea/vomiting, denies constipation/diarrhea.  Pt. confused and appears demented therefor history is unreliable.    ROS: Unreliable in this pt. given her probable dementia    A airway intact, speaking full sentences  B equal breath sounds bilaterally  C radial/DP/femoral pulses intact bilaterally   D GCS15 [id number], moving all fours, no focal deficits, pupils R 3mm reactive/L 3mm reactive  E patient fully exposed, provided warm blankets, L hip remarkable for shortened and externally rotated L hip    CXR no fracture or hemopneumothorax  FAST negative    Secondary survey remarkable for shortened and externally rotated L hip    ROS: 10-system review is otherwise negative except HPI above.      PAST MEDICAL & SURGICAL HISTORY: HTN, CAD, HLD    PSH: hysterectomy    FAMILY HISTORY:    [] Family history not pertinent as reviewed with the patient and family    SOCIAL HISTORY: Unknown smoking, ETOH or drug abuse history    ALLERGIES: No Known Allergies      HOME MEDICATIONS:   Metoprolol 50 BID  KCl 10 meq BID  Plavix 75  Simvastatin 20  Lasix 40 Qd    --------------------------------------------------------------------------------------------    PHYSICAL EXAM:   General: NAD  Neuro: A+Ox3  HEENT: NC/AT, EOMI  Cardio: RRR, nml S1/S2  Resp: Good effort, CTA b/l  Thorax: No chest wall tenderness  Breast: No lesions/masses, no drainage  GI/Abd: Soft, NT/ND, no rebound/guarding, no masses palpated  Vascular: All 4 extremities warm.  Skin: Intact, no breakdown  Pelvis: unstable, shortened and externally rotated L leg  Musculoskeletal: no spinal tenderness or stepoffs, moving all 4 extremities  : fecal soiling in underwear  ECHO: Pt. uncooperative and in pain from left hip fracture  --------------------------------------------------------------------------------------------    LABS                 13.9   13.68  )----------(  208       ( 24 Jul 2020 19:01 )               41.6      140    |  102    |  23.0   ----------------------------<  178        ( 24 Jul 2020 19:01 )  3.8     |  22.0   |  1.41     Ca    8.9        ( 24 Jul 2020 19:01 )    TPro  5.5    /  Alb  3.2    /  TBili  0.9    /  DBili  x      /  AST  15     /  ALT  9      /  AlkPhos  150    ( 24 Jul 2020 19:01 )    LIVER FUNCTIONS - ( 24 Jul 2020 19:01 )  Alb: 3.2 g/dL / Pro: 5.5 g/dL / ALK PHOS: 150 U/L / ALT: 9 U/L / AST: 15 U/L / GGT: x           PT/INR -  11.8 sec / 1.02 ratio   ( 24 Jul 2020 19:01 )       PTT -  30.2 sec   ( 24 Jul 2020 19:01 )  --------------------------------------------------------------------------------------------  IMAGING  XR Pelvis:     CT C/A/P: Recto-sigmoid mass measuring 5.2 cm. in length L hip fracture      ASSESSMENT: Patient is a 86y old female s/p ground level fall and     PLAN:    - tertiary exam  - f/u ortho recs  - f/u CT A/P  - f/u XR pelvis  - pain control  - Patient seen/examined with attending.  - Plan to be discussed with Attending, Dr. Jaimes (24 Jul 2020 19:26  - Eventual colonoscopy once left hip fracture stabilized.      FAMILY HISTORY: Unknown      SOCIAL HISTORY:  Smoking Status: Unknown  Pack Years: Unknown. Unknown ETOH or drug abuse history    MEDICATIONS:  MEDICATIONS  (STANDING):  acetaminophen   Tablet .. 650 milliGRAM(s) Oral every 6 hours  lactated ringers. 1000 milliLiter(s) (75 mL/Hr) IV Continuous <Continuous>  metoprolol tartrate 50 milliGRAM(s) Oral every 12 hours  polyethylene glycol 3350 17 Gram(s) Oral daily  senna 2 Tablet(s) Oral at bedtime    MEDICATIONS  (PRN):  HYDROmorphone  Injectable 0.5 milliGRAM(s) IV Push every 4 hours PRN Moderate Pain/Severe Pain (4 - 10)  ondansetron Injectable 4 milliGRAM(s) IV Push every 6 hours PRN Nausea and/or Vomiting      Allergies    No Known Allergies    Intolerances        Vital Signs Last 24 Hrs  T(C): 36.5 (25 Jul 2020 04:54), Max: 36.8 (24 Jul 2020 22:25)  T(F): 97.7 (25 Jul 2020 04:54), Max: 98.3 (24 Jul 2020 22:25)  HR: 89 (25 Jul 2020 04:54) (89 - 135)  BP: 122/74 (25 Jul 2020 04:54) (112/65 - 122/74)  BP(mean): --  RR: 18 (25 Jul 2020 04:54) (18 - 21)  SpO2: 96% (25 Jul 2020 04:54) (96% - 100%)    07-24 @ 07:01  -  07-25 @ 07:00  --------------------------------------------------------  IN: 525 mL / OUT: 150 mL / NET: 375 mL      LABS:                        13.9   13.68 )-----------( 208      ( 24 Jul 2020 19:01 )             41.6     07-24    140  |  102  |  23.0<H>  ----------------------------<  178<H>  3.8   |  22.0  |  1.41<H>    Ca    8.9      24 Jul 2020 19:01    TPro  5.5<L>  /  Alb  3.2<L>  /  TBili  0.9  /  DBili  x   /  AST  15  /  ALT  9   /  AlkPhos  150<H>  07-24          RADIOLOGY & ADDITIONAL STUDIES:

## 2020-07-25 NOTE — PROGRESS NOTE ADULT - SUBJECTIVE AND OBJECTIVE BOX
[facility]    [id number]    History:  The patient is status post fall sustaining a left intertrochanteric hip fracture.  Surgical intervention is planned for tomorrow/ Sunday 7/26. The patient's pain is controlled using the prescribed pain medications.  Denies nausea, vomiting, chest pain, shortness of breath, abdominal pain or fever. No new complaints. No acute motor or sensory changes are reported.    Vital Signs Last 24 Hrs  T(C): 36.4 (25 Jul 2020 15:47), Max: 36.8 (24 Jul 2020 22:25)  T(F): 97.5 (25 Jul 2020 15:47), Max: 98.3 (24 Jul 2020 22:25)  HR: 92 (25 Jul 2020 15:47) (85 - 135)  BP: 112/66 (25 Jul 2020 15:47) (98/62 - 122/74)  BP(mean): --  RR: 18 (25 Jul 2020 15:47) (18 - 21)  SpO2: 97% (25 Jul 2020 15:47) (94% - 100%)  I&O's Summary    24 Jul 2020 07:01  -  25 Jul 2020 07:00  --------------------------------------------------------  IN: 525 mL / OUT: 150 mL / NET: 375 mL                              11.2   13.32 )-----------( 181      ( 25 Jul 2020 08:17 )             33.8     07-25    138  |  103  |  25.0<H>  ----------------------------<  190<H>  3.8   |  19.0<L>  |  1.18    Ca    8.4<L>      25 Jul 2020 08:17  Mg     1.9     07-25    TPro  5.5<L>  /  Alb  3.2<L>  /  TBili  0.9  /  DBili  x   /  AST  15  /  ALT  9   /  AlkPhos  150<H>  07-24      MEDICATIONS  (STANDING):  acetaminophen   Tablet .. 650 milliGRAM(s) Oral every 6 hours  enoxaparin Injectable 40 milliGRAM(s) SubCutaneous daily  lactated ringers. 1000 milliLiter(s) (75 mL/Hr) IV Continuous <Continuous>  metoprolol tartrate 50 milliGRAM(s) Oral every 12 hours  polyethylene glycol 3350 17 Gram(s) Oral daily  potassium chloride    Tablet ER 20 milliEquivalent(s) Oral once  senna 2 Tablet(s) Oral at bedtime    MEDICATIONS  (PRN):  HYDROmorphone  Injectable 0.5 milliGRAM(s) IV Push every 4 hours PRN Moderate Pain/Severe Pain (4 - 10)  ondansetron Injectable 4 milliGRAM(s) IV Push every 6 hours PRN Nausea and/or Vomiting      Physical exam:  Sensation to light touch is grossly intact without focal deficit and is symmetric bilaterally. No calf tenderness. Sensation to light touch is grossly intact distally. Motor function distally is 5/5. No foot drop. 2+ dorsalis pedis pulse. Capillary refill is less than 2 seconds. No cyanosis.    Primary Orthopedic Assessment:  •   Secondary  Orthopedic Assessment(s):   •   Secondary  Medical Assessment(s):   •   Plan:   • DVT prophylaxis as prescribed, including use of compression devices and ankle pumps  •Surgical intervention planned 7/26 for Left hip IM nail  • Pain control as clinically indicated  • Incentive spirometry encouraged  NPO past midnight

## 2020-07-25 NOTE — CONSULT NOTE ADULT - PROBLEM SELECTOR RECOMMENDATION 9
Management as per Orthopedics. Eventual colonoscopy when left hip fracture stabilized and orthopedic and medical condition optimized.

## 2020-07-25 NOTE — CONSULT NOTE ADULT - SUBJECTIVE AND OBJECTIVE BOX
[facility]                                                               Office:  [address] [address]                                                              Telephone: [phone]. Fax:[phone]                                                                        CARDIOLOGY CONSULTATION NOTE                                                                                             Consult requested by:  Dr. Saldivar   Reason for Consultation: Cardiac Risk Stratification  History obtained by: Patient and medical record   obtained: No    Chief complaint:    Patient is a 86y old  Female who presents with a chief complaint of ground level fall, obvious deformity in L hip (25 Jul 2020 07:15)  Appreciate Above  Pt is a 87 y/o female with medical history of HTN, HLD, CAD s/p stent on Plavix, dementia, who presents to [facility]ED s/p fall. Pt is cognitively impaired and could not contribute to HPI. Attempted to call pt son (POA) who lives in first floor apartment with mother- no answer, called pt daughter Ginny who stays upstairs from patient. Ginny states that as per brother, he was in the kitchen when he heard patient fall onto coffee table. Pt did not have LOC when found. Pt states that she was in the living room and tried to stand up and she fell hitting the back of her head. Pt was BIBA to [facility]ED where CT head showed no acute bleeding, CT Chest- Comminuted left intertrochanteric fracture with displaced fragments. No acute intrathoracic or abdominal injury visualized.   Lobular wall thickening in the distal sigmoid/upper rectum which is highly concerning for neoplasm, Xray- intratrochanteric fracture with increased angulation at the fracture site and comminution of the greater lesser trochanters. Plan for surgical intervention of hip today. Upon assessment, pt presents with confusion (which is baseline according to pt daughter). Pt denies chest pain, palpitations, SOB, N/V/D, fevers, chills, cough, dizziness.                 REVIEW OF SYMPTOMS:     CONSTITUTIONAL: No fever, weight loss, or fatigue  ENMT:  No difficulty hearing, tinnitus, vertigo; No sinus or throat pain  NECK: No pain or stiffness  CARDIOVASCULAR: See HPI  RESPIRATORY: No Dyspnea on exertion, Shortness of breath, cough, wheezing  : No dysuria, no hematuria   GI: No dark color stool, no melena, no diarrhea, no constipation, no abdominal pain   NEURO: No headache, no dizziness, no slurred speech   MUSCULOSKELETAL: No joint pain or swelling; No muscle, back, or extremity pain  PSYCH: No agitation, no anxiety.    ALL OTHER REVIEW OF SYSTEMS ARE NEGATIVE.      PREVIOUS DIAGNOSTIC TESTING  ECHO FINDINGS: pending reading        ALLERGIES: Allergies    No Known Allergies    Intolerances      PAST MEDICAL HISTORY      PAST SURGICAL HISTORY      FAMILY HISTORY:      SOCIAL HISTORY:  Denies smoking/alcohol/drugs      CURRENT MEDICATIONS:  metoprolol tartrate 50 milliGRAM(s) Oral every 12 hours  acetaminophen   Tablet ..  polyethylene glycol 3350  senna  lactated ringers.        HOME MEDICATIONS:    furosemide 40 mg oral tablet: 1 tab(s) orally once a day (24 Jul 2020 19:49)  Metoprolol Tartrate 50 mg oral tablet: 1 tab(s) orally 2 times a day (24 Jul 2020 19:49)  Plavix 75 mg oral tablet: 1 tab(s) orally once a day (24 Jul 2020 19:49)  potassium chloride 10 mEq oral capsule, extended release: 1 cap(s) orally 2 times a day (24 Jul 2020 19:49)  simvastatin 20 mg oral tablet: 1 tab(s) orally once a day (at bedtime) (24 Jul 2020 19:49)      Vital Signs Last 24 Hrs  T(C): 36.4 (25 Jul 2020 07:32), Max: 36.8 (24 Jul 2020 22:25)  T(F): 97.6 (25 Jul 2020 07:32), Max: 98.3 (24 Jul 2020 22:25)  HR: 85 (25 Jul 2020 07:32) (85 - 135)  BP: 98/62 (25 Jul 2020 07:32) (98/62 - 122/74)  RR: 18 (25 Jul 2020 07:32) (18 - 21)  SpO2: 94% (25 Jul 2020 07:32) (94% - 100%)      PHYSICAL EXAM:  Constitutional: Comfortable . No acute distress.   HEENT: Atraumatic and normocephalic , neck is supple . no JVD. No carotid bruit. PEERL   CNS: A&Ox2. No focal deficits. EOMI.   Lymph Nodes: Cervical : Not palpable.  Respiratory: CTAB  Cardiovascular: S1S2 RRR. No murmur/rubs or gallop.  Gastrointestinal: Soft non-tender and non distended . +Bowel sounds. negative Lyn's sign.  Extremities: Trace edema.   Psychiatric: Calm . no agitation.  Skin: No skin rash/ulcers visualized to face, hands or feet.    Intake and output:   07-24 @ 07:01  -  07-25 @ 07:00  --------------------------------------------------------  IN: 525 mL / OUT: 150 mL / NET: 375 mL        LABS:                        11.2   13.32 )-----------( 181      ( 25 Jul 2020 08:17 )             33.8     07-25    138  |  103  |  25.0<H>  ----------------------------<  190<H>  3.8   |  19.0<L>  |  1.18    Ca    8.4<L>      25 Jul 2020 08:17  Mg     1.9     07-25    TPro  5.5<L>  /  Alb  3.2<L>  /  TBili  0.9  /  DBili  x   /  AST  15  /  ALT  9   /  AlkPhos  150<H>  07-24      ;p-BNP=  PT/INR - ( 24 Jul 2020 19:01 )   PT: 11.8 sec;   INR: 1.02 ratio         PTT - ( 24 Jul 2020 19:01 )  PTT:30.2 sec      INTERPRETATION OF TELEMETRY:  Afib HR @ 80s with occasional PVCs  ECG: Afib HR @ 119 with occasional PVCs    RADIOLOGY & ADDITIONAL STUDIES:    X-ray:  < from: Xray Femur 2 Views, Left (07.24.20 @ 19:50) >  Intertrochanteric fracture with comminution greater lesser trochanter is redemonstrated.    There is quite advanced left knee degeneration.    The lower femur is intact.    IMPRESSION: As above.    < end of copied text >    < from: Xray Hip 2-3 Views, Left (07.24.20 @ 19:49) >  There is mild left hip degeneration.    There is an intratrochanteric fracture with increased angulation at the fracture site and comminution of the greater lesser trochanters.    IMPRESSION: Fracture as above.    < end of copied text >    < from: Xray Chest 1 View AP/PA. (07.24.20 @ 19:12) >  Heart is magnified by technique.    The lung fields and pleural surfaces are unremarkable.    There is a diffuse slight right thoracic curve.    IMPRESSION: Diffuse slight right thoracic curve. No acute finding.      < end of copied text >    CT scan: < from: CT Chest w/ IV Cont (07.24.20 @ 19:26) >  IMPRESSION:   Comminuted left intertrochanteric fracture with displaced fragments. No acute intrathoracic or abdominal injury visualized.   Lobular wall thickening in the distal sigmoid/upper rectum which is highly concerning for neoplasm.  This unsuspected finding was discussed with Dr. Roland the emergency room at 7:55 PM on 7/24/2020.    Heterogeneously enhancing nodule left lobe of the thyroid gland. Recommend further evaluation with thyroid ultrasound.    Distended bile ducts with large amount of pneumobilia. Correlate with patient's history.    < end of copied text >    < from: CT Cervical Spine No Cont (07.24.20 @ 19:26) >    IMPRESSION:  Mild degenerative spondylosis.  No fracture, dislocation or prevertebral soft tissue swelling of the cervical spine.      < end of copied text >      MRI:   < from: CT Head No Cont (07.24.20 @ 19:25) >  IMPRESSION:    No acute intracranial findings.        < end of copied text > [place] [facility]                                                               Office:  [address] [address]                                                              Telephone: [phone]. Fax:[phone]                                                                        CARDIOLOGY CONSULTATION NOTE                                                                                             Consult requested by:  Dr. Saldivar   Reason for Consultation: Cardiac Risk Stratification  History obtained by: Patient and medical record   obtained: No    Chief complaint:    Patient is a 86y old  Female who presents with a chief complaint of ground level fall, obvious deformity in L hip (25 Jul 2020 07:15)  Appreciate Above  Pt is a 87 y/o female with medical history of HTN, HLD, CAD s/p stent on Plavix, dementia, who presents to [facility]ED s/p fall. Pt is cognitively impaired and could not contribute to HPI. Attempted to call pt son (POA) who lives in first floor apartment with mother- no answer, called pt daughter Ginny who stays upstairs from patient. Ginny states that as per brother, he was in the kitchen when he heard patient fall onto coffee table. Pt did not have LOC when found. Pt states that she was in the living room and tried to stand up and she fell hitting the back of her head. Pt was BIBA to [facility]ED where CT head showed no acute bleeding, CT Chest- Comminuted left intertrochanteric fracture with displaced fragments. No acute intrathoracic or abdominal injury visualized.   Lobular wall thickening in the distal sigmoid/upper rectum which is highly concerning for neoplasm, Xray- intratrochanteric fracture with increased angulation at the fracture site and comminution of the greater lesser trochanters. Plan for surgical intervention of hip today. Upon assessment, pt presents with confusion (which is baseline according to pt daughter). Pt denies chest pain, palpitations, SOB, N/V/D, fevers, chills, cough, dizziness.         REVIEW OF SYMPTOMS:     CONSTITUTIONAL: No fever, weight loss, or fatigue  ENMT:  No difficulty hearing, tinnitus, vertigo; No sinus or throat pain  NECK: No pain or stiffness  CARDIOVASCULAR: See HPI  RESPIRATORY: No Dyspnea on exertion, Shortness of breath, cough, wheezing  : No dysuria, no hematuria   GI: No dark color stool, no melena, no diarrhea, no constipation, no abdominal pain   NEURO: No headache, no dizziness, no slurred speech   MUSCULOSKELETAL: No joint pain or swelling; No muscle, back, or extremity pain  PSYCH: No agitation, no anxiety.    ALL OTHER REVIEW OF SYSTEMS ARE NEGATIVE.      PREVIOUS DIAGNOSTIC TESTING  ECHO FINDINGS: pending reading < from: TTE Echo Complete w/o Contrast w/ Doppler (07.25.20 @ 09:34) >  Summary:   1. Technically adequate study.   2. Normal global left ventricular systolic function.   3. Left ventricular ejection fraction, by visual estimation, is 65 to 70%.   4. Spectral Doppler shows impaired relaxation pattern of left ventricular myocardial filling (Grade I diastolic dysfunction).   5. Severely enlarged left atrium.   6. Mild mitral annular calcification.   7. Mild thickening of the anterior and posterior mitral valve leaflets.   8. Mild mitral valve regurgitation.   9. Sclerotic aortic valve with normal opening.  10. Mild aortic regurgitation.  11. Moderate tricuspid regurgitation.  12. There is no evidence of pericardial effusion.    < end of copied text >          ALLERGIES: Allergies    No Known Allergies    Intolerances      PAST MEDICAL HISTORY      PAST SURGICAL HISTORY      FAMILY HISTORY:      SOCIAL HISTORY:  Denies smoking/alcohol/drugs      CURRENT MEDICATIONS:  metoprolol tartrate 50 milliGRAM(s) Oral every 12 hours  acetaminophen   Tablet ..  polyethylene glycol 3350  senna  lactated ringers.        HOME MEDICATIONS:    furosemide 40 mg oral tablet: 1 tab(s) orally once a day (24 Jul 2020 19:49)  Metoprolol Tartrate 50 mg oral tablet: 1 tab(s) orally 2 times a day (24 Jul 2020 19:49)  Plavix 75 mg oral tablet: 1 tab(s) orally once a day (24 Jul 2020 19:49)  potassium chloride 10 mEq oral capsule, extended release: 1 cap(s) orally 2 times a day (24 Jul 2020 19:49)  simvastatin 20 mg oral tablet: 1 tab(s) orally once a day (at bedtime) (24 Jul 2020 19:49)      Vital Signs Last 24 Hrs  T(C): 36.4 (25 Jul 2020 07:32), Max: 36.8 (24 Jul 2020 22:25)  T(F): 97.6 (25 Jul 2020 07:32), Max: 98.3 (24 Jul 2020 22:25)  HR: 85 (25 Jul 2020 07:32) (85 - 135)  BP: 98/62 (25 Jul 2020 07:32) (98/62 - 122/74)  RR: 18 (25 Jul 2020 07:32) (18 - 21)  SpO2: 94% (25 Jul 2020 07:32) (94% - 100%)      PHYSICAL EXAM:  Constitutional: Comfortable . No acute distress.   HEENT: Atraumatic and normocephalic , neck is supple . no JVD. No carotid bruit. PEERL   CNS: A&Ox2. No focal deficits. EOMI.   Lymph Nodes: Cervical : Not palpable.  Respiratory: CTAB  Cardiovascular: S1S2 RRR. No murmur/rubs or gallop.  Gastrointestinal: Soft non-tender and non distended . +Bowel sounds. negative Lyn's sign.  Extremities: Trace edema.   Psychiatric: Calm . no agitation.  Skin: No skin rash/ulcers visualized to face, hands or feet.    Intake and output:   07-24 @ 07:01  -  07-25 @ 07:00  --------------------------------------------------------  IN: 525 mL / OUT: 150 mL / NET: 375 mL        LABS:                        11.2   13.32 )-----------( 181      ( 25 Jul 2020 08:17 )             33.8     07-25    138  |  103  |  25.0<H>  ----------------------------<  190<H>  3.8   |  19.0<L>  |  1.18    Ca    8.4<L>      25 Jul 2020 08:17  Mg     1.9     07-25    TPro  5.5<L>  /  Alb  3.2<L>  /  TBili  0.9  /  DBili  x   /  AST  15  /  ALT  9   /  AlkPhos  150<H>  07-24      ;p-BNP=  PT/INR - ( 24 Jul 2020 19:01 )   PT: 11.8 sec;   INR: 1.02 ratio         PTT - ( 24 Jul 2020 19:01 )  PTT:30.2 sec      INTERPRETATION OF TELEMETRY:  Afib HR @ 80s with occasional PVCs  ECG: Afib HR @ 119 with occasional PVCs    RADIOLOGY & ADDITIONAL STUDIES:    X-ray:  < from: Xray Femur 2 Views, Left (07.24.20 @ 19:50) >  Intertrochanteric fracture with comminution greater lesser trochanter is redemonstrated.    There is quite advanced left knee degeneration.    The lower femur is intact.    IMPRESSION: As above.    < end of copied text >    < from: Xray Hip 2-3 Views, Left (07.24.20 @ 19:49) >  There is mild left hip degeneration.    There is an intratrochanteric fracture with increased angulation at the fracture site and comminution of the greater lesser trochanters.    IMPRESSION: Fracture as above.    < end of copied text >    < from: Xray Chest 1 View AP/PA. (07.24.20 @ 19:12) >  Heart is magnified by technique.    The lung fields and pleural surfaces are unremarkable.    There is a diffuse slight right thoracic curve.    IMPRESSION: Diffuse slight right thoracic curve. No acute finding.      < end of copied text >    CT scan: < from: CT Chest w/ IV Cont (07.24.20 @ 19:26) >  IMPRESSION:   Comminuted left intertrochanteric fracture with displaced fragments. No acute intrathoracic or abdominal injury visualized.   Lobular wall thickening in the distal sigmoid/upper rectum which is highly concerning for neoplasm.  This unsuspected finding was discussed with Dr. Roland the emergency room at 7:55 PM on 7/24/2020.    Heterogeneously enhancing nodule left lobe of the thyroid gland. Recommend further evaluation with thyroid ultrasound.    Distended bile ducts with large amount of pneumobilia. Correlate with patient's history.    < end of copied text >    < from: CT Cervical Spine No Cont (07.24.20 @ 19:26) >    IMPRESSION:  Mild degenerative spondylosis.  No fracture, dislocation or prevertebral soft tissue swelling of the cervical spine.      < end of copied text >      MRI:   < from: CT Head No Cont (07.24.20 @ 19:25) >  IMPRESSION:    No acute intracranial findings.        < end of copied text >

## 2020-07-26 ENCOUNTER — TRANSCRIPTION ENCOUNTER (OUTPATIENT)
Age: 85
End: 2020-07-26

## 2020-07-26 DIAGNOSIS — S72.065S: ICD-10-CM

## 2020-07-26 LAB
ALBUMIN SERPL ELPH-MCNC: 2.8 G/DL — LOW (ref 3.3–5.2)
ALP SERPL-CCNC: 126 U/L — HIGH (ref 40–120)
ALT FLD-CCNC: 10 U/L — SIGNIFICANT CHANGE UP
ANION GAP SERPL CALC-SCNC: 15 MMOL/L — SIGNIFICANT CHANGE UP (ref 5–17)
ANION GAP SERPL CALC-SCNC: 19 MMOL/L — HIGH (ref 5–17)
AST SERPL-CCNC: 19 U/L — SIGNIFICANT CHANGE UP
BASOPHILS # BLD AUTO: 0.01 K/UL — SIGNIFICANT CHANGE UP (ref 0–0.2)
BASOPHILS NFR BLD AUTO: 0.1 % — SIGNIFICANT CHANGE UP (ref 0–2)
BILIRUB SERPL-MCNC: 1.5 MG/DL — SIGNIFICANT CHANGE UP (ref 0.4–2)
BUN SERPL-MCNC: 31 MG/DL — HIGH (ref 8–20)
BUN SERPL-MCNC: 33 MG/DL — HIGH (ref 8–20)
CALCIUM SERPL-MCNC: 8.1 MG/DL — LOW (ref 8.6–10.2)
CALCIUM SERPL-MCNC: 8.5 MG/DL — LOW (ref 8.6–10.2)
CEA SERPL-MCNC: 7.8 NG/ML — HIGH (ref 0–3.8)
CHLORIDE SERPL-SCNC: 101 MMOL/L — SIGNIFICANT CHANGE UP (ref 98–107)
CHLORIDE SERPL-SCNC: 103 MMOL/L — SIGNIFICANT CHANGE UP (ref 98–107)
CO2 SERPL-SCNC: 18 MMOL/L — LOW (ref 22–29)
CO2 SERPL-SCNC: 22 MMOL/L — SIGNIFICANT CHANGE UP (ref 22–29)
CREAT SERPL-MCNC: 1.31 MG/DL — HIGH (ref 0.5–1.3)
CREAT SERPL-MCNC: 1.39 MG/DL — HIGH (ref 0.5–1.3)
EOSINOPHIL # BLD AUTO: 0 K/UL — SIGNIFICANT CHANGE UP (ref 0–0.5)
EOSINOPHIL NFR BLD AUTO: 0 % — SIGNIFICANT CHANGE UP (ref 0–6)
GLUCOSE SERPL-MCNC: 167 MG/DL — HIGH (ref 70–99)
GLUCOSE SERPL-MCNC: 185 MG/DL — HIGH (ref 70–99)
HCT VFR BLD CALC: 31.4 % — LOW (ref 34.5–45)
HCT VFR BLD CALC: 35.8 % — SIGNIFICANT CHANGE UP (ref 34.5–45)
HGB BLD-MCNC: 10.2 G/DL — LOW (ref 11.5–15.5)
HGB BLD-MCNC: 11.5 G/DL — SIGNIFICANT CHANGE UP (ref 11.5–15.5)
IMM GRANULOCYTES NFR BLD AUTO: 0.5 % — SIGNIFICANT CHANGE UP (ref 0–1.5)
LYMPHOCYTES # BLD AUTO: 0.91 K/UL — LOW (ref 1–3.3)
LYMPHOCYTES # BLD AUTO: 6.5 % — LOW (ref 13–44)
MAGNESIUM SERPL-MCNC: 1.9 MG/DL — SIGNIFICANT CHANGE UP (ref 1.6–2.6)
MAGNESIUM SERPL-MCNC: 2.1 MG/DL — SIGNIFICANT CHANGE UP (ref 1.6–2.6)
MCHC RBC-ENTMCNC: 31.4 PG — SIGNIFICANT CHANGE UP (ref 27–34)
MCHC RBC-ENTMCNC: 31.5 PG — SIGNIFICANT CHANGE UP (ref 27–34)
MCHC RBC-ENTMCNC: 32.1 GM/DL — SIGNIFICANT CHANGE UP (ref 32–36)
MCHC RBC-ENTMCNC: 32.5 GM/DL — SIGNIFICANT CHANGE UP (ref 32–36)
MCV RBC AUTO: 96.9 FL — SIGNIFICANT CHANGE UP (ref 80–100)
MCV RBC AUTO: 97.8 FL — SIGNIFICANT CHANGE UP (ref 80–100)
MONOCYTES # BLD AUTO: 0.77 K/UL — SIGNIFICANT CHANGE UP (ref 0–0.9)
MONOCYTES NFR BLD AUTO: 5.5 % — SIGNIFICANT CHANGE UP (ref 2–14)
NEUTROPHILS # BLD AUTO: 12.18 K/UL — HIGH (ref 1.8–7.4)
NEUTROPHILS NFR BLD AUTO: 87.4 % — HIGH (ref 43–77)
PHOSPHATE SERPL-MCNC: 4.8 MG/DL — HIGH (ref 2.4–4.7)
PHOSPHATE SERPL-MCNC: 5 MG/DL — HIGH (ref 2.4–4.7)
PLATELET # BLD AUTO: 194 K/UL — SIGNIFICANT CHANGE UP (ref 150–400)
PLATELET # BLD AUTO: 216 K/UL — SIGNIFICANT CHANGE UP (ref 150–400)
POTASSIUM SERPL-MCNC: 4.2 MMOL/L — SIGNIFICANT CHANGE UP (ref 3.5–5.3)
POTASSIUM SERPL-MCNC: 4.4 MMOL/L — SIGNIFICANT CHANGE UP (ref 3.5–5.3)
POTASSIUM SERPL-SCNC: 4.2 MMOL/L — SIGNIFICANT CHANGE UP (ref 3.5–5.3)
POTASSIUM SERPL-SCNC: 4.4 MMOL/L — SIGNIFICANT CHANGE UP (ref 3.5–5.3)
PROT SERPL-MCNC: 4.6 G/DL — LOW (ref 6.6–8.7)
RBC # BLD: 3.24 M/UL — LOW (ref 3.8–5.2)
RBC # BLD: 3.66 M/UL — LOW (ref 3.8–5.2)
RBC # FLD: 13.1 % — SIGNIFICANT CHANGE UP (ref 10.3–14.5)
RBC # FLD: 13.2 % — SIGNIFICANT CHANGE UP (ref 10.3–14.5)
SODIUM SERPL-SCNC: 138 MMOL/L — SIGNIFICANT CHANGE UP (ref 135–145)
SODIUM SERPL-SCNC: 140 MMOL/L — SIGNIFICANT CHANGE UP (ref 135–145)
WBC # BLD: 13.94 K/UL — HIGH (ref 3.8–10.5)
WBC # BLD: 14.84 K/UL — HIGH (ref 3.8–10.5)
WBC # FLD AUTO: 13.94 K/UL — HIGH (ref 3.8–10.5)
WBC # FLD AUTO: 14.84 K/UL — HIGH (ref 3.8–10.5)

## 2020-07-26 PROCEDURE — 27245 TREAT THIGH FRACTURE: CPT | Mod: LT

## 2020-07-26 PROCEDURE — 99231 SBSQ HOSP IP/OBS SF/LOW 25: CPT

## 2020-07-26 PROCEDURE — 99233 SBSQ HOSP IP/OBS HIGH 50: CPT

## 2020-07-26 RX ORDER — TRAMADOL HYDROCHLORIDE 50 MG/1
50 TABLET ORAL EVERY 6 HOURS
Refills: 0 | Status: DISCONTINUED | OUTPATIENT
Start: 2020-07-26 | End: 2020-07-30

## 2020-07-26 RX ORDER — SODIUM CHLORIDE 9 MG/ML
1000 INJECTION, SOLUTION INTRAVENOUS
Refills: 0 | Status: DISCONTINUED | OUTPATIENT
Start: 2020-07-26 | End: 2020-07-26

## 2020-07-26 RX ORDER — GABAPENTIN 400 MG/1
300 CAPSULE ORAL EVERY 8 HOURS
Refills: 0 | Status: DISCONTINUED | OUTPATIENT
Start: 2020-07-26 | End: 2020-07-28

## 2020-07-26 RX ORDER — FENTANYL CITRATE 50 UG/ML
25 INJECTION INTRAVENOUS
Refills: 0 | Status: DISCONTINUED | OUTPATIENT
Start: 2020-07-26 | End: 2020-07-26

## 2020-07-26 RX ORDER — TRAMADOL HYDROCHLORIDE 50 MG/1
25 TABLET ORAL EVERY 6 HOURS
Refills: 0 | Status: DISCONTINUED | OUTPATIENT
Start: 2020-07-26 | End: 2020-07-30

## 2020-07-26 RX ORDER — CLOPIDOGREL BISULFATE 75 MG/1
75 TABLET, FILM COATED ORAL DAILY
Refills: 0 | Status: DISCONTINUED | OUTPATIENT
Start: 2020-07-27 | End: 2020-07-27

## 2020-07-26 RX ORDER — HEPARIN SODIUM 5000 [USP'U]/ML
5000 INJECTION INTRAVENOUS; SUBCUTANEOUS EVERY 8 HOURS
Refills: 0 | Status: DISCONTINUED | OUTPATIENT
Start: 2020-07-26 | End: 2020-07-30

## 2020-07-26 RX ORDER — CEFAZOLIN SODIUM 1 G
2000 VIAL (EA) INJECTION
Refills: 0 | Status: COMPLETED | OUTPATIENT
Start: 2020-07-26 | End: 2020-07-27

## 2020-07-26 RX ORDER — ONDANSETRON 8 MG/1
4 TABLET, FILM COATED ORAL ONCE
Refills: 0 | Status: DISCONTINUED | OUTPATIENT
Start: 2020-07-26 | End: 2020-07-30

## 2020-07-26 RX ADMIN — GABAPENTIN 300 MILLIGRAM(S): 400 CAPSULE ORAL at 19:02

## 2020-07-26 RX ADMIN — Medication 650 MILLIGRAM(S): at 21:02

## 2020-07-26 RX ADMIN — Medication 650 MILLIGRAM(S): at 17:42

## 2020-07-26 RX ADMIN — HEPARIN SODIUM 5000 UNIT(S): 5000 INJECTION INTRAVENOUS; SUBCUTANEOUS at 21:02

## 2020-07-26 RX ADMIN — Medication 650 MILLIGRAM(S): at 19:02

## 2020-07-26 RX ADMIN — Medication 50 MILLIGRAM(S): at 05:44

## 2020-07-26 RX ADMIN — Medication 650 MILLIGRAM(S): at 06:44

## 2020-07-26 RX ADMIN — Medication 100 MILLIGRAM(S): at 21:01

## 2020-07-26 RX ADMIN — Medication 650 MILLIGRAM(S): at 05:44

## 2020-07-26 RX ADMIN — SODIUM CHLORIDE 75 MILLILITER(S): 9 INJECTION, SOLUTION INTRAVENOUS at 17:43

## 2020-07-26 RX ADMIN — Medication 650 MILLIGRAM(S): at 21:00

## 2020-07-26 RX ADMIN — SENNA PLUS 2 TABLET(S): 8.6 TABLET ORAL at 21:02

## 2020-07-26 RX ADMIN — SODIUM CHLORIDE 75 MILLILITER(S): 9 INJECTION, SOLUTION INTRAVENOUS at 02:00

## 2020-07-26 NOTE — PROGRESS NOTE ADULT - SUBJECTIVE AND OBJECTIVE BOX
Orthopedic PA Postop Note  Patient S/P LEFT HIP IM NAIL  Patient in bed comfortable   LEFT Leg  Dressing C/D/I   DP Pulse intact   Calf Soft NT  Dorsi/Plantar Flexion/EHL/FHL intact   Sensation intact to light touch    Vital Signs Last 24 Hrs  T(C): 36.8 (26 Jul 2020 18:05), Max: 37.6 (26 Jul 2020 16:00)  T(F): 98.2 (26 Jul 2020 18:05), Max: 99.6 (26 Jul 2020 16:00)  HR: 71 (26 Jul 2020 18:05) (71 - 119)  BP: 99/63 (26 Jul 2020 18:05) (99/63 - 133/62)  BP(mean): --  RR: 18 (26 Jul 2020 18:05) (18 - 27)  SpO2: 97% (26 Jul 2020 18:05) (95% - 100%)        A/P:  S/P LEFT HIP IM NAIL  1. DVTP - as per trauma/medicine  2. Physical Therapy   3. Pain Control as clinically indicated

## 2020-07-26 NOTE — DISCHARGE NOTE PROVIDER - CARE PROVIDER_API CALL
Mario Gomez  ORTHOPAEDIC SURGERY  [address] [place] [address]  Phone: [phone]  Fax: [phone]  Follow Up Time: Mario Gomez  ORTHOPAEDIC SURGERY  [address] [place] [address]  Phone: [phone]  Fax: [phone]  Follow Up Time:     Eusebio Pearce  OTOLARYNGOLOGY  [address] [place] [address]  Phone: [phone]  Fax: [phone]  Follow Up Time:     Jose De Jesus Currie  GASTROENTEROLOGY  [address] [place] [address]  Phone: [phone]  Fax: [phone]  Follow Up Time:

## 2020-07-26 NOTE — DISCHARGE NOTE PROVIDER - NSDCFUADDINST_GEN_ALL_CORE_FT
ORTHOPEDIC FOLLOW UP RECOMMENDATIONS: The patient will be seen in the office between 2-3 weeks for wound check. Sutures/Staples/Tape will be removed at that time. Patient may shower after post-op day #5. The dressing is to be removed on post op day #9 (8/4/20). IF THE DRESSING BECOMES SOILED BEFORE THE REMOVAL DATE, CHANGE WITH A SIMILAR DRESSING. IF THE DRESSING BECOMES STAINED WITH DISCHARGE, CONTACT THE OFFICE FOR FURTHER DIRECTIONS.  The patient will contact the office if the wound becomes red, has increasing pain, develops bleeding or discharge, an injury occurs, or has other concerns. The patient will continue PT for gait training. The patient will continue DVT Prophylaxis as per trauma/medicine team for blood clot prevention.  The patient will take Colace while taking oxycodone to prevent narcotic associated constipation.  Additionally, increase water intake (drink at least 8 glasses of water daily) and try adding fiber to the diet by eating fruits, vegetables and foods that are rich in grains. If constipation is experienced, contact the medical/primary care provider to discuss further treatment options. The patient is FULL weight bearing.

## 2020-07-26 NOTE — PROGRESS NOTE ADULT - PROBLEM SELECTOR PLAN 1
For OR today. Please refer pt. for OPT colonoscopy when recovered for Left hip fracture. No plans for inpatient colonoscopy at this time.

## 2020-07-26 NOTE — DISCHARGE NOTE PROVIDER - NSDCDCMDCOMP_GEN_ALL_CORE
10/11/2019    Name: Tracey Canada  MRN: [id number]  : 1943    Reason for visit: Medication management for depression, anxiety.      Chief Complaint: Still feeling depressed    Medications:  Current Outpatient Medications   Medication Sig Dispense Refill   • tiZANidine (ZANAFLEX) 2 MG tablet Take 0.5 tablets by mouth 3 times daily as needed for Muscle spasms. Indications: uses PRN 90 tablet 0   • HYDROcodone-acetaminophen (NORCO) 5-325 MG per tablet Take 1 tablet by mouth every 8 hours as needed for Pain. 15 tablet 0   • electrolyte/PEG 3350 (COLYTE) 240 g solution USE AS DIRECTED FOR COLONOSCOPY 4000 mL 0   • bisacodyl (DULCOLAX) 5 MG EC tablet Please take as instructed by Colonoscopy instructions 4 tablet 0   • naltrexone 4.5 mg capsule Take 1 capsule by mouth daily. Do not start before 2019. Start once the 3mg caps are completed 90 g 0   • ferrous sulfate 325 (65 FE) MG tablet Take 1 tablet by mouth daily (with breakfast).     • spironolactone (ALDACTONE) 25 MG tablet Take 1 tablet by mouth daily. 30 tablet 5   • montelukast (SINGULAIR) 10 MG tablet Take 1 tablet by mouth nightly. 30 tablet 4   • YUVAFEM 10 MCG vaginal tablet INSET 1 TABLET VAGINALLY TWICE WEEKLY 24 tablet 1   • escitalopram (LEXAPRO) 5 MG tablet Take 1 tablet by mouth daily. 30 tablet 1   • nystatin (MYCOSTATIN) 166675 units Tab Take 4 tablets by mouth 3 times daily. Do not start before 2019. 360 tablet 4   • gabapentin (NEURONTIN) 400 MG capsule Take 3 capsules by mouth nightly. 270 capsule 1   • diclofenac (VOLTAREN) 1 % gel Apply 4 g topically 4 times daily as needed (painful areas). Max 4g/total per application and max 16g/day 4 Tube 5   • MAGNESIUM PO Take 2 tablets by mouth daily.      • potassium CHLORIDE (KLOR-CON M) 20 MEQ melinda ER tablet Take 2 tablets by mouth daily. (Patient taking differently: Take 20 mEq by mouth 2 times daily. ) 180 tablet 3   • pramipexole (MIRAPEX) 0.25 MG tablet TAKE 1 &  1/2 (ONE & ONE-HALF) TABLETS BY MOUTH IN THE EVENING 135 tablet 1   • losartan (COZAAR) 100 MG tablet Take 1 tablet by mouth daily. 90 tablet 1   • metoPROLOL succinate (TOPROL-XL) 25 MG 24 hr tablet Take 0.5 tablets by mouth daily. 90 tablet 3   • DULERA 200-5 MCG/ACT inhaler INHALE TWO PUFFS BY MOUTH TWICE DAILY 1 Inhaler 6   • acetaminophen (TYLENOL) 500 MG tablet Take 1,000 mg by mouth every 6 hours as needed for Pain.     • fluticasone (FLONASE) 50 MCG/ACT nasal spray Spray 1 spray in each nostril 2 times daily. (Patient taking differently: Spray 1 spray in each nostril as needed. ) 1 Bottle 3   • vitamin - therapeutic multivitamins w/minerals (CENTRUM SILVER,THERA-M) Tab Take 1 tablet by mouth daily.     • albuterol (VENTOLIN HFA) 108 (90 BASE) MCG/ACT inhaler Inhale 1 puff into the lungs every 4 hours as needed for Shortness of Breath or Wheezing. 2 puffs qid PRN. 1 Inhaler 1   • OMEGA 3 1000 MG PO CAPS 1 capsule twice daily 0 0   • Vortioxetine HBr (TRINTELLIX) 20 MG Tab Take 1 tablet by mouth daily. 30 tablet 3     No current facility-administered medications for this visit.        No major side effects.    Allergies:   ALLERGIES:   Allergen Reactions   • Bandaid [Adhesive   (Environmental)] PRURITUS and ERYTHEMA     Welts from EKG patches   • Crestor [Rosuvastatin Calcium] MYALGIA   • Livalo [Pitavastatin Calcium] MYALGIA   • Pravastatin MYALGIA   • Simvastatin MYALGIA   • Ace Inhibitors Cough   • Amlodipine Other (See Comments)     itching   • Ezetimibe Muscle Spasm     Back spasms and leg pain   • Fluvastatin    • Ibuprofen Other (See Comments)     Hypertension   • Lyrica [Pregabalin]      Itchy   • Meloxicam Other (See Comments)     Unknown reaction   • Methadone PRURITUS   • Savella      Itchy   • Tequin [Gatifloxacin Sesquihydrate] PRURITUS   • Topamax [Topiramate] PRURITUS   • Trazodone VOMITING   • Tricor        Visit Vitals  /78 (BP Location: Other (comment), Patient Position: Sitting)    Pulse 82   Ht 5' 2.21\" (1.58 m)   Wt 83.8 kg   LMP 11/19/1984   BMI 33.58 kg/m²         Subjective: Patient reported she has noticed improvement on current medication. She reported she has been tolerating medication well but she has been still struggling with sadness of mood, low energy, low motivation, feeling tired. She reported it is difficult for her to make changes in her lifestyle. She reported her son has been a huge support to her. She also reported her  has been \"same\". Patient reported she has been reaching out to her friends. She has also joined yoga. She reported she is happy to meet new people. Patient denied any safety concerns. No concerns related to manic episode. No concerns related to psychotic features. No history of any suicidal homicidal ideations. Patient reported she was worried about side effects on the medication. Patient seems to be quite anxious about side effects on the medication..     REVIEW OF SYSTEMS:   Psychiatric: No history suggestive of psychosis. No history suggestive of illicit drug use in recent period of time.  Constitutional:  Weight loss  []       Weight Gain   []  No change  []   Neurological:     Headache- Yes [] No    [x]                                Rigidity -Yes [] No [x]                                    Spasticity  -Yes   []  No  [x]   Gastrointestinal - Nausea  Yes  []   No  [x]               Stomach upset  Yes []     No  [x]   All other systems  reviewed and are negative.    Comprehensive Past/Family/Social history which was performed during initial evaluation was reexamined and reviewed with patient/family. There is nothing new to add today. For details, please refer to my initial evaluation note in this chart.  Family Psychiatric History Diagnosis/Medications AODA   Mother:       []?  Yes  []?  No   []?  Yes  [x]?  No   Father:        []?  Yes  []?  No   []?  Yes  [x]?  No   Siblings:      [x]?  Yes  []?  No   []?  Yes  [x]?  No   Extended Family:   [x]?  Yes []?  No Anxiety disorder  []?  Yes  [x]?  No      Psychosocial History: Patient is living alone. Patient is  her  is in memory care unit       Mental Status Examination:  Casually dressed, healthy looking, [x] well [] poorly groomed [] young [] middle aged   [x] old, [] man [] woman showed   [x] good [] partial [] no interest in interview.    Eye to eye contact was [x] maintained, [] not maintained, [] partially maintained.  Rapport established.      Mood was [] euthymic, [x]depressed, [] irritable, [] anxious, [] angry, [] euphoric,   [] empty, [] guilty, [] perplexed.    It was [x] consistent, [] fluctuating, [] alternating rapidly between extremes during the interview.    Affect was [x] full, [] constricted, [] blunted, [] flat in range and [] congruent,   [] not congruent with mood.    Speech was [x] spontaneous, [] not spontaneous    Rate and rhythm was [x] regular, [] slow, [] pressured, [] hesitant, [] emotional,   [] dramatic, [] loud, [] monotonous, [] whispered, [] slurred.    Attention and concentration was [x] good, [] poor, [] impaired.    Thought process was [x] logical and coherent, [] illogical, [] incomprehensible.    Rate of thoughts was [x] normal, [] slow, [] hesitant, [] rapid, [] poverty of ideas,   [] overabundance of ideas, [] racing, [] flights of ideas, [] thought blocking.    Associations of thoughts:  [x] Intact, [] loose, [] circumstantial, [] tangential,   [] word salad, [] neologisms.    Thought content:    Delusions  Yes  []    No  [x]     Obsessions  Yes  []    No  [x]     Hallucinations  Yes  []    No  [x]     Tics  Yes  []    No  [x]     Suicidal ideations:  [x] none, [] passive, [] present with plan.    Alert and oriented x3.  Memory-[x] immediate, [x]recent, [x] remote-intact.  Language fluent.    Judgment was [x] fair, [] poor, [] impaired.    Insight was [] good, [x] limited, [] poor.    Fund of knowledge was [x] good, [] limited, [] poor.    Rigidity  Yes  []    No  [x]       Muscle strength and tone -Normal.    Diagnosis:  Major depressive disorder recurrent moderate  Rule out persistent depressive disorder  Generalized anxiety disorder    Treatment Intervention: Patient  agreed with above-mentioned medication management. I discussed the patient about continue current medication as prescribed. Majority of the discussion in today's visit was focused on how to help her to cope with mood symptoms, anxiety symptoms. We decided to increase the dose of her medication. Patient agreed with the treatment plan. No other new concerns. Majority of the discussion in today's visit was focused on how to help patient to cope with mood symptoms, anxiety symptoms.     Patient agreed with treatment plan.    Labs ordered   []   Labs reviewed   []     I will follow up with patient in 2-3 months. If any safety concern arises, patient will call 911, go to emergency room, or nearby hospital. Patient can call my office for any questions or concerns during regular business hours.    Andrew Friend MD   This document is complete and the patient is ready for discharge.

## 2020-07-26 NOTE — DISCHARGE NOTE PROVIDER - NSDCCPCAREPLAN_GEN_ALL_CORE_FT
PRINCIPAL DISCHARGE DIAGNOSIS  Diagnosis: Femur fracture, left  Assessment and Plan of Treatment: ORTHOPEDIC FOLLOW UP RECOMMENDATIONS: The patient will be seen in the office between 2-3 weeks for wound check. Sutures/Staples/Tape will be removed at that time. Patient may shower after post-op day #5. The dressing is to be removed on post op day #9 (8/4/20). IF THE DRESSING BECOMES SOILED BEFORE THE REMOVAL DATE, CHANGE WITH A SIMILAR DRESSING. IF THE DRESSING BECOMES STAINED WITH   BATHING: Please do not submerge wound underwater. You may shower and/or sponge bathe.   ACTIVITY: No heavy lifting or straining. Otherwise, you may return to your usual level of physical activity. If you are taking narcotic pain medication (such as Percocet) DO NOT drive a car, operate machinery or make important decisions.  DIET: Return to your usual diet.  NOTIFY YOUR SURGEON IF: You have any bleeding that does not stop, any pus draining from your wound(s), any fever (over 100.4 F) or chills, persistent nausea/vomiting, persistent diarrhea, or if your pain is not controlled on your discharge pain medications.  FOLLOW-UP: Please also follow up with your primary care physician CONTACT THE OFFICE FOR FURTHER DIRECTIONS.  The patient will contact the office if the wound becomes red, has increasing pain, develops bleeding or discharge, an injury occurs, or has other concerns. The patient will continue PT for gait training. The patient will continue DVT Prophylaxis as per trauma/medicine team for blood clot prevention.  The patient will take Colace while taking oxycodone to prevent narcotic associated constipation.  Additionally, increase water intake (drink at least 8 glasses of water daily) and try adding fiber to the diet by eating fruits, vegetables and foods that are rich in grains. If constipation is experienced, contact the medical/primary care provider to discuss further treatment options. The patient is FULL weight bearing.      SECONDARY DISCHARGE DIAGNOSES  Diagnosis: Thyroid nodule  Assessment and Plan of Treatment: Pt.'s son instructed to follow up with ENT for evaluation.  Dr. Pearce number provided or can go to  ENT doctor of your choice    Diagnosis: Rectal mass  Assessment and Plan of Treatment: Pt's son instructed to follow up as outpatient with GI.  Number provided for Dr. Buenrsotro or can go to GI doctor of your choice. PRINCIPAL DISCHARGE DIAGNOSIS  Diagnosis: Femur fracture, left  Assessment and Plan of Treatment: ORTHOPEDIC FOLLOW UP RECOMMENDATIONS: The patient will be seen in the office between 2-3 weeks for wound check. Sutures/Staples/Tape will be removed at that time. Patient may shower after post-op day #5. The dressing is to be removed on post op day #9 (8/4/20). IF THE DRESSING BECOMES SOILED BEFORE THE REMOVAL DATE, CHANGE WITH A SIMILAR DRESSING. IF THE DRESSING BECOMES STAINED WITH   BATHING: Please do not submerge wound underwater. You may shower and/or sponge bathe.   ACTIVITY: No heavy lifting or straining. Otherwise, you may return to your usual level of physical activity. If you are taking narcotic pain medication (such as Percocet) DO NOT drive a car, operate machinery or make important decisions.  DIET: Return to your usual diet.  NOTIFY YOUR SURGEON IF: You have any bleeding that does not stop, any pus draining from your wound(s), any fever (over 100.4 F) or chills, persistent nausea/vomiting, persistent diarrhea, or if your pain is not controlled on your discharge pain medications.  FOLLOW-UP: Please also follow up with your primary care physician CONTACT THE OFFICE FOR FURTHER DIRECTIONS.  The patient will contact the office if the wound becomes red, has increasing pain, develops bleeding or discharge, an injury occurs, or has other concerns. The patient will continue PT for gait training. The patient will continue DVT Prophylaxis as per trauma/medicine team for blood clot prevention.  The patient will take Colace while taking oxycodone to prevent narcotic associated constipation.  Additionally, increase water intake (drink at least 8 glasses of water daily) and try adding fiber to the diet by eating fruits, vegetables and foods that are rich in grains. If constipation is experienced, contact the medical/primary care provider to discuss further treatment options. The patient is FULL weight bearing.      SECONDARY DISCHARGE DIAGNOSES  Diagnosis: Urinary retention  Assessment and Plan of Treatment: Kerr kept in place, flomax started.  Please attempt TOV when pt. more ambulatory    Diagnosis: Thyroid nodule  Assessment and Plan of Treatment: Pt.'s son instructed to follow up with ENT for evaluation.  Dr. Pearce number provided or can go to  ENT doctor of your choice    Diagnosis: Rectal mass  Assessment and Plan of Treatment: Pt's son instructed to follow up as outpatient with GI.  Number provided for Dr. Buenrostro or can go to GI doctor of your choice.
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
## 2020-07-26 NOTE — PROGRESS NOTE ADULT - SUBJECTIVE AND OBJECTIVE BOX
Pt seen and examined. For OR today for repair of left hip fracture.     REVIEW OF SYSTEMS:  Unreliable in this pt.      MEDICATIONS:  MEDICATIONS  (STANDING):  acetaminophen   Tablet .. 650 milliGRAM(s) Oral every 6 hours  enoxaparin Injectable 40 milliGRAM(s) SubCutaneous daily  lactated ringers. 1000 milliLiter(s) (75 mL/Hr) IV Continuous <Continuous>  metoprolol tartrate 50 milliGRAM(s) Oral every 12 hours  polyethylene glycol 3350 17 Gram(s) Oral daily  senna 2 Tablet(s) Oral at bedtime    MEDICATIONS  (PRN):  HYDROmorphone  Injectable 0.5 milliGRAM(s) IV Push every 4 hours PRN Moderate Pain/Severe Pain (4 - 10)  ondansetron Injectable 4 milliGRAM(s) IV Push every 6 hours PRN Nausea and/or Vomiting      Allergies    No Known Allergies    Intolerances        Vital Signs Last 24 Hrs  T(C): 36.9 (26 Jul 2020 07:52), Max: 36.9 (26 Jul 2020 07:52)  T(F): 98.4 (26 Jul 2020 07:52), Max: 98.4 (26 Jul 2020 07:52)  HR: 87 (26 Jul 2020 07:52) (87 - 106)  BP: 109/73 (26 Jul 2020 07:52) (109/73 - 133/62)  BP(mean): --  RR: 18 (26 Jul 2020 07:52) (18 - 18)  SpO2: 95% (26 Jul 2020 07:52) (95% - 98%)    07-25 @ 07:01  -  07-26 @ 07:00  --------------------------------------------------------  IN: 825 mL / OUT: 0 mL / NET: 825 mL        PHYSICAL EXAM:   Unchanged from yesterday. Abdominal exam is unremarkable.    LABS:  CBC Full  -  ( 26 Jul 2020 07:13 )  WBC Count : 14.84 K/uL  RBC Count : 3.66 M/uL  Hemoglobin : 11.5 g/dL  Hematocrit : 35.8 %  Platelet Count - Automated : 216 K/uL  Mean Cell Volume : 97.8 fl  Mean Cell Hemoglobin : 31.4 pg  Mean Cell Hemoglobin Concentration : 32.1 gm/dL  Auto Neutrophil # : x  Auto Lymphocyte # : x  Auto Monocyte # : x  Auto Eosinophil # : x  Auto Basophil # : x  Auto Neutrophil % : x  Auto Lymphocyte % : x  Auto Monocyte % : x  Auto Eosinophil % : x  Auto Basophil % : x    07-25    138  |  103  |  25.0<H>  ----------------------------<  190<H>  3.8   |  19.0<L>  |  1.18    Ca    8.4<L>      25 Jul 2020 08:17  Mg     1.9     07-25    TPro  5.5<L>  /  Alb  3.2<L>  /  TBili  0.9  /  DBili  x   /  AST  15  /  ALT  9   /  AlkPhos  150<H>  07-24    PT/INR - ( 24 Jul 2020 19:01 )   PT: 11.8 sec;   INR: 1.02 ratio         PTT - ( 24 Jul 2020 19:01 )  PTT:30.2 sec                  RADIOLOGY & ADDITIONAL STUDIES (The following images were personally reviewed):

## 2020-07-26 NOTE — CHART NOTE - NSCHARTNOTEFT_GEN_A_CORE
POST-OPERATIVE NOTE    Subjective:  Patient evaluated at bedside found hemodynamically stable in no distress. Denies any dizziness, SOB, chest pain, weakness, paresthesia         ceFAZolin   IVPB 2000  ceFAZolin   IVPB 2000  ceFAZolin   IVPB 2000  ceFAZolin   IVPB 2000  metoprolol tartrate 50    PAST MEDICAL & SURGICAL HISTORY:      Objective:  Vital Signs Last 24 Hrs  T(C): 36.8 (26 Jul 2020 18:05), Max: 37.6 (26 Jul 2020 16:00)  T(F): 98.2 (26 Jul 2020 18:05), Max: 99.6 (26 Jul 2020 16:00)  HR: 71 (26 Jul 2020 18:05) (71 - 119)  BP: 99/63 (26 Jul 2020 18:05) (99/63 - 133/62)  BP(mean): --  RR: 18 (26 Jul 2020 18:05) (18 - 27)  SpO2: 97% (26 Jul 2020 18:05) (95% - 100%)  I&O's Detail      26 Jul 2020 07:01  -  26 Jul 2020 19:02  --------------------------------------------------------  IN:    lactated ringers.: 600 mL  Total IN: 600 mL    OUT:  Total OUT: 0 mL    Total NET: 600 mL    Physical Exam:  General: alert, oriented x 3 in no acute distress   Pulmonary: Nonlabored breathing, symmetric chest movement, clear to auscultation b/l   CV: normal S1/S2, RRR  Abdomen: soft, depressible, non-tender, non-distended    MSK: LLE: clean dressings over surgical incision sites, limited range of movement due to pain, palpable pulses DP           RLE: full range of movement, no edema or cyanosis  present    LABS:                        11.5   14.84 )-----------( 216      ( 26 Jul 2020 07:13 )             35.8     07-26    140  |  103  |  31.0<H>  ----------------------------<  167<H>  4.2   |  18.0<L>  |  1.31<H>    Ca    8.5<L>      26 Jul 2020 07:13  Phos  5.0     07-26  Mg     2.1     07-26    TPro  4.6<L>  /  Alb  2.8<L>  /  TBili  1.5  /  DBili  x   /  AST  19  /  ALT  10  /  AlkPhos  126<H>  07-26    Assessment:  87 y/o F s/p left IM nail.         Plan:  -F/U PT consult   - Pain control  - Continue IV abx  - Continue IVF

## 2020-07-26 NOTE — DISCHARGE NOTE PROVIDER - PROVIDER TOKENS
PROVIDER:[TOKEN:[[id number]]] PROVIDER:[TOKEN:[[id number]]],PROVIDER:[TOKEN:[[id number]]],PROVIDER:[TOKEN:[[id number]]]

## 2020-07-26 NOTE — DISCHARGE NOTE PROVIDER - CARE PROVIDERS DIRECT ADDRESSES
,[email or web address]

## 2020-07-26 NOTE — DISCHARGE NOTE PROVIDER - NSDCMRMEDTOKEN_GEN_ALL_CORE_FT
furosemide 40 mg oral tablet: 1 tab(s) orally once a day  Metoprolol Tartrate 50 mg oral tablet: 1 tab(s) orally 2 times a day  Plavix 75 mg oral tablet: 1 tab(s) orally once a day  potassium chloride 10 mEq oral capsule, extended release: 1 cap(s) orally 2 times a day  simvastatin 20 mg oral tablet: 1 tab(s) orally once a day (at bedtime) acetaminophen 325 mg oral tablet: 3 tab(s) orally every 6 hours  furosemide 40 mg oral tablet: 1 tab(s) orally once a day  gabapentin 400 mg oral capsule: 1 cap(s) orally once a day (at bedtime)  heparin: 5000 unit(s) subcutaneous 3 times a day  Metoprolol Tartrate 50 mg oral tablet: 1 tab(s) orally 2 times a day  Plavix 75 mg oral tablet: 1 tab(s) orally once a day  polyethylene glycol 3350 oral powder for reconstitution: 17 gram(s) orally once a day  senna oral tablet: 2 tab(s) orally once a day (at bedtime)  simvastatin 20 mg oral tablet: 1 tab(s) orally once a day (at bedtime)  tamsulosin 0.4 mg oral capsule: 1 cap(s) orally once a day (at bedtime)  traMADol 50 mg oral tablet: 0.5 tab(s) orally every 6 hours, As needed, Moderate Pain (4 - 6)  traMADol 50 mg oral tablet: 1 tab(s) orally every 6 hours, As needed, Severe Pain (7 - 10)

## 2020-07-26 NOTE — PROGRESS NOTE ADULT - SUBJECTIVE AND OBJECTIVE BOX
87 yo F s/p GLF, L intertrochanteric fx. Seen and examined at bedside sleeping comfortably. Per nurseLICHA Denies n/v/f/SOB/CO. Pt seen by cards who cleared her for OR with ortho for today 7/26. GI consulted outpt colonoscopy for rectal mass w/u. Thyroid markers WNL.     MEDICATIONS  (STANDING):  acetaminophen   Tablet .. 650 milliGRAM(s) Oral every 6 hours  enoxaparin Injectable 40 milliGRAM(s) SubCutaneous daily  lactated ringers. 1000 milliLiter(s) (75 mL/Hr) IV Continuous <Continuous>  metoprolol tartrate 50 milliGRAM(s) Oral every 12 hours  polyethylene glycol 3350 17 Gram(s) Oral daily  senna 2 Tablet(s) Oral at bedtime    MEDICATIONS  (PRN):  HYDROmorphone  Injectable 0.5 milliGRAM(s) IV Push every 4 hours PRN Moderate Pain/Severe Pain (4 - 10)  ondansetron Injectable 4 milliGRAM(s) IV Push every 6 hours PRN Nausea and/or Vomiting      Vital Signs Last 24 Hrs  T(C): 36.6 (25 Jul 2020 19:29), Max: 36.6 (25 Jul 2020 19:29)  T(F): 97.8 (25 Jul 2020 19:29), Max: 97.8 (25 Jul 2020 19:29)  HR: 88 (25 Jul 2020 19:29) (85 - 135)  BP: 116/63 (25 Jul 2020 19:29) (98/62 - 122/74)  BP(mean): --  RR: 18 (25 Jul 2020 19:29) (18 - 21)  SpO2: 95% (25 Jul 2020 19:29) (94% - 98%)    Constitutional: NAD, well-groomed, well-developed  HEENT: PERRLA, EOMI, no drainage or redness  Neck: No bruits; no thyromegaly or nodules,  No JVD  Back: Normal spine flexure, No CVA tenderness, No deformity or limitation of movement  Respiratory: Breath Sounds equal & clear to percussion & auscultation, no accessory muscle use  Cardiovascular: Regular rate & rhythm, normal S1, S2; no murmurs, gallops or rubs; no S3, S4  Gastrointestinal: Soft, non-tender, no hepatosplenomegaly, normal bowel sounds  Extremities: No peripheral edema, No cyanosis, clubbing   Vascular: Equal and normal pulses: 2+ peripheral pulses throughout  Neurological: GCS:   (  /   /   ). A&O x 3; no sensory, motor or coordination deficits, normal reflexes  Psychiatric: Normal mood, normal affect  Musculoskeletal: No joint pain, swelling or deformity; no limitation of movement  Skin: No rashes      I&O's Detail    24 Jul 2020 07:01  -  25 Jul 2020 07:00  --------------------------------------------------------  IN:    lactated ringers.: 525 mL  Total IN: 525 mL    OUT:    Voided: 150 mL  Total OUT: 150 mL    Total NET: 375 mL          LABS:                        11.2   13.32 )-----------( 181      ( 25 Jul 2020 08:17 )             33.8     07-25    138  |  103  |  25.0<H>  ----------------------------<  190<H>  3.8   |  19.0<L>  |  1.18    Ca    8.4<L>      25 Jul 2020 08:17  Mg     1.9     07-25    TPro  5.5<L>  /  Alb  3.2<L>  /  TBili  0.9  /  DBili  x   /  AST  15  /  ALT  9   /  AlkPhos  150<H>  07-24    PT/INR - ( 24 Jul 2020 19:01 )   PT: 11.8 sec;   INR: 1.02 ratio         PTT - ( 24 Jul 2020 19:01 )  PTT:30.2 sec

## 2020-07-26 NOTE — DISCHARGE NOTE PROVIDER - HOSPITAL COURSE
86F PMHx HTN, CAD s/p PCI, HLD who was  BIB EMS after a ground level fall about three hours prior to arrival in the ED. Patient thought pain would get better over time but instead it got worse. Patient denies loss of consciousness.  Found to have L intertrochanteric fx, no other injuries noted.  She is s/p uncomplicated L IMN femur on 7/26.  Post op course c/w NIKOLAI and decreased urine output now resolved.  She has been evaluated by PT and deemed appropriate for [facility] at this time.  Her pain is adequately controlled at this time with oral meds, tolerating regular diet and hemodynamically stable.  She can be discharged to [facility] at this time.        On admission she had multiple CT scans done in the ED which although incidentally revealed a Rectal mass, thyroid nodule and pneumobilia.  This was discussed with the patient's son in detail and he was told that upon discharge from rehab she would have to f/u with GI, and ENT.  Numbers to be provided or he can take her to a practitioner of his choice. 86F PMHx HTN, CAD s/p PCI, HLD who was  BIB EMS after a ground level fall about three hours prior to arrival in the ED. Patient thought pain would get better over time but instead it got worse. Patient denies loss of consciousness.  Found to have L intertrochanteric fx, no other injuries noted.  She is s/p uncomplicated L IMN femur on 7/26.  Post op course c/w NIKOLAI and decreased urine output that are now resolved however also had urinary retention, mazariegos left in place started on flomax and TOV to be attempted once more ambulatory.  She has been evaluated by PT and deemed appropriate for [facility] at this time.  Her pain is adequately controlled at this time with oral meds, tolerating regular diet and hemodynamically stable.  She can be discharged to [facility] at this time.        On admission she had multiple CT scans done in the ED which although incidentally revealed a Rectal mass, thyroid nodule and pneumobilia.  This was discussed with the patient's son in detail and he was told that upon discharge from rehab she would have to f/u with GI, and ENT.  Numbers to be provided or he can take her to a practitioner of his choice.

## 2020-07-27 LAB
ANION GAP SERPL CALC-SCNC: 15 MMOL/L — SIGNIFICANT CHANGE UP (ref 5–17)
BASOPHILS # BLD AUTO: 0.01 K/UL — SIGNIFICANT CHANGE UP (ref 0–0.2)
BASOPHILS NFR BLD AUTO: 0.1 % — SIGNIFICANT CHANGE UP (ref 0–2)
BUN SERPL-MCNC: 35 MG/DL — HIGH (ref 8–20)
CALCIUM SERPL-MCNC: 7.8 MG/DL — LOW (ref 8.6–10.2)
CHLORIDE SERPL-SCNC: 102 MMOL/L — SIGNIFICANT CHANGE UP (ref 98–107)
CO2 SERPL-SCNC: 21 MMOL/L — LOW (ref 22–29)
CREAT SERPL-MCNC: 1.46 MG/DL — HIGH (ref 0.5–1.3)
EOSINOPHIL # BLD AUTO: 0.15 K/UL — SIGNIFICANT CHANGE UP (ref 0–0.5)
EOSINOPHIL NFR BLD AUTO: 1.5 % — SIGNIFICANT CHANGE UP (ref 0–6)
GLUCOSE SERPL-MCNC: 155 MG/DL — HIGH (ref 70–99)
HCT VFR BLD CALC: 28.8 % — LOW (ref 34.5–45)
HGB BLD-MCNC: 9.3 G/DL — LOW (ref 11.5–15.5)
IMM GRANULOCYTES NFR BLD AUTO: 0.5 % — SIGNIFICANT CHANGE UP (ref 0–1.5)
LYMPHOCYTES # BLD AUTO: 1.21 K/UL — SIGNIFICANT CHANGE UP (ref 1–3.3)
LYMPHOCYTES # BLD AUTO: 12.5 % — LOW (ref 13–44)
MAGNESIUM SERPL-MCNC: 1.9 MG/DL — SIGNIFICANT CHANGE UP (ref 1.6–2.6)
MANUAL SMEAR VERIFICATION: SIGNIFICANT CHANGE UP
MCHC RBC-ENTMCNC: 31.4 PG — SIGNIFICANT CHANGE UP (ref 27–34)
MCHC RBC-ENTMCNC: 32.3 GM/DL — SIGNIFICANT CHANGE UP (ref 32–36)
MCV RBC AUTO: 97.3 FL — SIGNIFICANT CHANGE UP (ref 80–100)
MONOCYTES # BLD AUTO: 0.66 K/UL — SIGNIFICANT CHANGE UP (ref 0–0.9)
MONOCYTES NFR BLD AUTO: 6.8 % — SIGNIFICANT CHANGE UP (ref 2–14)
NEUTROPHILS # BLD AUTO: 7.6 K/UL — HIGH (ref 1.8–7.4)
NEUTROPHILS NFR BLD AUTO: 78.6 % — HIGH (ref 43–77)
OVALOCYTES BLD QL SMEAR: SLIGHT — SIGNIFICANT CHANGE UP
PHOSPHATE SERPL-MCNC: 4.5 MG/DL — SIGNIFICANT CHANGE UP (ref 2.4–4.7)
PLAT MORPH BLD: NORMAL — SIGNIFICANT CHANGE UP
PLATELET # BLD AUTO: 182 K/UL — SIGNIFICANT CHANGE UP (ref 150–400)
POIKILOCYTOSIS BLD QL AUTO: SLIGHT — SIGNIFICANT CHANGE UP
POLYCHROMASIA BLD QL SMEAR: SLIGHT — SIGNIFICANT CHANGE UP
POTASSIUM SERPL-MCNC: 4.1 MMOL/L — SIGNIFICANT CHANGE UP (ref 3.5–5.3)
POTASSIUM SERPL-SCNC: 4.1 MMOL/L — SIGNIFICANT CHANGE UP (ref 3.5–5.3)
RBC # BLD: 2.96 M/UL — LOW (ref 3.8–5.2)
RBC # FLD: 13.2 % — SIGNIFICANT CHANGE UP (ref 10.3–14.5)
RBC BLD AUTO: ABNORMAL
SODIUM SERPL-SCNC: 138 MMOL/L — SIGNIFICANT CHANGE UP (ref 135–145)
WBC # BLD: 9.68 K/UL — SIGNIFICANT CHANGE UP (ref 3.8–10.5)
WBC # FLD AUTO: 9.68 K/UL — SIGNIFICANT CHANGE UP (ref 3.8–10.5)

## 2020-07-27 PROCEDURE — 99231 SBSQ HOSP IP/OBS SF/LOW 25: CPT

## 2020-07-27 PROCEDURE — 99223 1ST HOSP IP/OBS HIGH 75: CPT

## 2020-07-27 PROCEDURE — 99232 SBSQ HOSP IP/OBS MODERATE 35: CPT

## 2020-07-27 RX ORDER — CLOPIDOGREL BISULFATE 75 MG/1
75 TABLET, FILM COATED ORAL DAILY
Refills: 0 | Status: DISCONTINUED | OUTPATIENT
Start: 2020-07-27 | End: 2020-07-30

## 2020-07-27 RX ORDER — MAGNESIUM SULFATE 500 MG/ML
2 VIAL (ML) INJECTION ONCE
Refills: 0 | Status: COMPLETED | OUTPATIENT
Start: 2020-07-27 | End: 2020-07-27

## 2020-07-27 RX ADMIN — Medication 50 GRAM(S): at 07:57

## 2020-07-27 RX ADMIN — Medication 650 MILLIGRAM(S): at 13:16

## 2020-07-27 RX ADMIN — HEPARIN SODIUM 5000 UNIT(S): 5000 INJECTION INTRAVENOUS; SUBCUTANEOUS at 05:24

## 2020-07-27 RX ADMIN — SODIUM CHLORIDE 75 MILLILITER(S): 9 INJECTION, SOLUTION INTRAVENOUS at 04:25

## 2020-07-27 RX ADMIN — GABAPENTIN 300 MILLIGRAM(S): 400 CAPSULE ORAL at 22:02

## 2020-07-27 RX ADMIN — SODIUM CHLORIDE 75 MILLILITER(S): 9 INJECTION, SOLUTION INTRAVENOUS at 13:16

## 2020-07-27 RX ADMIN — Medication 650 MILLIGRAM(S): at 14:16

## 2020-07-27 RX ADMIN — GABAPENTIN 300 MILLIGRAM(S): 400 CAPSULE ORAL at 05:24

## 2020-07-27 RX ADMIN — SENNA PLUS 2 TABLET(S): 8.6 TABLET ORAL at 22:01

## 2020-07-27 RX ADMIN — Medication 100 MILLIGRAM(S): at 05:25

## 2020-07-27 RX ADMIN — CLOPIDOGREL BISULFATE 75 MILLIGRAM(S): 75 TABLET, FILM COATED ORAL at 17:31

## 2020-07-27 RX ADMIN — Medication 650 MILLIGRAM(S): at 05:24

## 2020-07-27 RX ADMIN — HEPARIN SODIUM 5000 UNIT(S): 5000 INJECTION INTRAVENOUS; SUBCUTANEOUS at 13:16

## 2020-07-27 RX ADMIN — Medication 650 MILLIGRAM(S): at 17:31

## 2020-07-27 RX ADMIN — HEPARIN SODIUM 5000 UNIT(S): 5000 INJECTION INTRAVENOUS; SUBCUTANEOUS at 22:02

## 2020-07-27 RX ADMIN — Medication 650 MILLIGRAM(S): at 06:00

## 2020-07-27 RX ADMIN — GABAPENTIN 300 MILLIGRAM(S): 400 CAPSULE ORAL at 13:16

## 2020-07-27 NOTE — CONSULT NOTE ADULT - ATTENDING COMMENTS
Patient seen and examined on 7/27/2020. Case discussed with resident. Agree with recommendations.     Will continue to follow. Functional progress will determine ongoing rehab dispo recommendations, which may change. At this time, seems more appropriate for [facility], but also medically being optimized.     Continue bedside therapy as well as OOB throughout the day with mobilization by staff to maintain cardiopulmonary function and prevention of secondary complications related to debility.
We will plan on a TFN nail for the left hip IT fracture when medically cleared.  Consent was obtained from the POA.
pt seen and examined  spoke to son.  pt is s/p fall with left hip fx.  poor historian. pt has a hx of cad, as per family, stented over 15 years ago, no reported cp or sob.  EKG with afib, LAFB, no significant st/t changes  pt had a TTE, results reviewed as well  She is not diabetic, Cr 1.4 probably pre-renal.   BP is low, dehydrated. Agree with IVF  Afib rate controlled. We will monitor for now  Check trop and serum pro-BNP. If trop is negative, there is no contraindication from our standpoint to proceed. Given her age and risk factors, she is at least at moderate risk for cardiac complication.   AG Dela Cruz, pt's son.

## 2020-07-27 NOTE — PHYSICAL THERAPY INITIAL EVALUATION ADULT - ACTIVE RANGE OF MOTION EXAMINATION, REHAB EVAL
Right LE Active ROM was WFL (within functional limits)/bilateral upper extremity Active ROM was WFL (within functional limits)/Left LE grossly 1/5, pt very weak and with pain with any attempt to move Left LE/deficits as listed below

## 2020-07-27 NOTE — PHYSICAL THERAPY INITIAL EVALUATION ADULT - ADDITIONAL COMMENTS
Pt lives in a house with 0 steps to enter and 0  stairs inside.  Pt owns medical equipment: S[employer]  Pt lives with: Son-He works nights

## 2020-07-27 NOTE — PROGRESS NOTE ADULT - SUBJECTIVE AND OBJECTIVE BOX
[facility]    [id number]    History: Patient is status post left hip IM nail, 7/27, POD#1.  Patient is comfortable in bed. The patient's pain is controlled using the prescribed pain medications. The patient should be participating in physical therapy. Denies nausea, vomiting, chest pain, shortness of breath, abdominal pain or fever. No new complaints.                                9.3    9.68  )-----------( 182      ( 27 Jul 2020 06:04 )             28.8       07-27    138  |  102  |  35.0<H>  ----------------------------<  155<H>  4.1   |  21.0<L>  |  1.46<H>    Ca    7.8<L>      27 Jul 2020 06:04  Phos  4.5     07-27  Mg     1.9     07-27    TPro  4.6<L>  /  Alb  2.8<L>  /  TBili  1.5  /  DBili  x   /  AST  19  /  ALT  10  /  AlkPhos  126<H>  07-26        MEDICATIONS  (STANDING):  acetaminophen   Tablet .. 650 milliGRAM(s) Oral every 6 hours  ceFAZolin   IVPB 2000 milliGRAM(s) IV Intermittent once  gabapentin 300 milliGRAM(s) Oral every 8 hours  heparin   Injectable 5000 Unit(s) SubCutaneous every 8 hours  lactated ringers. 1000 milliLiter(s) (75 mL/Hr) IV Continuous <Continuous>  magnesium sulfate  IVPB 2 Gram(s) IV Intermittent once  metoprolol tartrate 50 milliGRAM(s) Oral every 12 hours  polyethylene glycol 3350 17 Gram(s) Oral daily  senna 2 Tablet(s) Oral at bedtime    MEDICATIONS  (PRN):  ondansetron Injectable 4 milliGRAM(s) IV Push every 6 hours PRN Nausea and/or Vomiting  ondansetron Injectable 4 milliGRAM(s) IV Push once PRN Nausea and/or Vomiting  traMADol 25 milliGRAM(s) Oral every 6 hours PRN Moderate Pain (4 - 6)  traMADol 50 milliGRAM(s) Oral every 6 hours PRN Severe Pain (7 - 10)      Physical exam: The left hip surgical dressing remains clean, dry and intact. No drainage or discharge. No erythema is noted. No blistering. No ecchymosis. The calf is supple nontender. Passive range of motion is acceptable to due postoperative pain. No calf tenderness. Sensation to light touch is grossly intact distally. Motor function distally is 5/5. No foot drop. 2+ dorsalis pedis pulse. Capillary refill is less than 2 seconds. No cyanosis.    Primary Orthopedic Assessment:  • s/p LEFT hip IM nail, POD#1    Secondary  Orthopedic Assessment(s):   •     Secondary  Medical Assessment(s):   •     Plan:   • DVT prophylaxis as prescribed, including use of compression devices and ankle pumps plavix/ aspirin  • Continue physical therapy, WBAT LLE  • Weightbearing as tolerated of the Left ower extremity with assistance of a walker  • Incentive spirometry encouraged  • Pain control as clinically indicated  • Discharge planning – anticipated discharge is  subacute rehabilitation

## 2020-07-27 NOTE — CONSULT NOTE ADULT - REASON FOR ADMISSION
ground level fall, obvious deformity in L hip

## 2020-07-27 NOTE — PROGRESS NOTE ADULT - ASSESSMENT
Pt is a 85 y/o female with medical history of HTN, HLD, CAD s/p stent on Plavix, dementia, who presents to [facility]-ED s/p fall. Pt is cognitively impaired and could not contribute to HPI. Attempted to call pt son (POA) who lives in first floor apartment with mother- no answer, called pt daughter Ginny who stays upstairs from patient. Ginny states that as per brother, he was in the kitchen when he heard patient fall onto coffee table. Pt did not have LOC when found. Pt states that she was in the living room and tried to stand up and she fell hitting the back of her head. Pt was BIBA to [facility]-ED where CT head showed no acute bleeding, CT Chest- Comminuted left intertrochanteric fracture with displaced fragments. No acute intrathoracic or abdominal injury visualized.   Lobular wall thickening in the distal sigmoid/upper rectum which is highly concerning for neoplasm, Xray- intratrochanteric fracture with increased angulation at the fracture site and comminution of the greater lesser trochanters. Plan for surgical intervention of hip today. Upon assessment, pt presents with confusion (which is baseline according to pt daughter). Pt denies chest pain, palpitations, SOB, N/V/D, fevers, chills, cough, dizziness.     7/27: POD#1 s/p left hip surgery. A&O to person, place, time confused to situation.  Denies cardiac complaints.    CAD  -c/w plavix, statin, BB    AFib  -c/w lopressor  -no AC d/t risk of falls    -Please have patient f/u with PCP concerning adrenal and thyroid nodule  -patient stable from cardiac standpoint. f/u as outpatient.       Preliminary evaluation, please await complete evaluation by Dr. Pack

## 2020-07-27 NOTE — PROGRESS NOTE ADULT - SUBJECTIVE AND OBJECTIVE BOX
ACS Surgery Progress Note     Subjective: Patient was evaluated at bedside found hemodynamically stable and afebrile    MEDICATIONS  (STANDING):  acetaminophen   Tablet .. 650 milliGRAM(s) Oral every 6 hours  ceFAZolin   IVPB 2000 milliGRAM(s) IV Intermittent once  ceFAZolin   IVPB 2000 milliGRAM(s) IV Intermittent <User Schedule>  gabapentin 300 milliGRAM(s) Oral every 8 hours  heparin   Injectable 5000 Unit(s) SubCutaneous every 8 hours  lactated ringers. 1000 milliLiter(s) (75 mL/Hr) IV Continuous <Continuous>  metoprolol tartrate 50 milliGRAM(s) Oral every 12 hours  polyethylene glycol 3350 17 Gram(s) Oral daily  senna 2 Tablet(s) Oral at bedtime    MEDICATIONS  (PRN):  ondansetron Injectable 4 milliGRAM(s) IV Push every 6 hours PRN Nausea and/or Vomiting  ondansetron Injectable 4 milliGRAM(s) IV Push once PRN Nausea and/or Vomiting  traMADol 25 milliGRAM(s) Oral every 6 hours PRN Moderate Pain (4 - 6)  traMADol 50 milliGRAM(s) Oral every 6 hours PRN Severe Pain (7 - 10)      Vital Signs:  Vital Signs Last 24 Hrs  T(C): 36.7 (26 Jul 2020 19:55), Max: 37.6 (26 Jul 2020 16:00)  T(F): 98 (26 Jul 2020 19:55), Max: 99.6 (26 Jul 2020 16:00)  HR: 88 (26 Jul 2020 19:55) (71 - 119)  BP: 96/63 (26 Jul 2020 19:55) (96/63 - 133/62)  BP(mean): --  RR: 16 (26 Jul 2020 19:55) (16 - 27)  SpO2: 95% (26 Jul 2020 19:55) (95% - 100%)    I&O's      26 Jul 2020 07:01  -  27 Jul 2020 03:32  --------------------------------------------------------  IN:    lactated ringers.: 1050 mL    Oral Fluid: 90 mL    Solution: 50 mL  Total IN: 1190 mL    OUT:  Total OUT: 0 mL    Total NET: 1190 mL    Physical Exam:  General: alert, oriented x 3, in no acute distress   Respiratory: clear to auscultation b/l, symmetric chest movement   CV: normal S1/S2, RRR, no rubs, murmurs, gallops   Abdomen:  soft, depressible, mildly tender, non-distended, +BS  MSK: LLE: surgical wounds covered with clean dressings, no edema or erythema present.      Labs:    07-26    138  |  101  |  33.0<H>  ----------------------------<  185<H>  4.4   |  22.0  |  1.39<H>    Ca    8.1<L>      26 Jul 2020 19:44  Phos  4.8     07-26  Mg     1.9     07-26    TPro  4.6<L>  /  Alb  2.8<L>  /  TBili  1.5  /  DBili  x   /  AST  19  /  ALT  10  /  AlkPhos  126<H>  07-26    LIVER FUNCTIONS - ( 26 Jul 2020 07:13 )  Alb: 2.8 g/dL / Pro: 4.6 g/dL / ALK PHOS: 126 U/L / ALT: 10 U/L / AST: 19 U/L / GGT: x                                 10.2   13.94 )-----------( 194      ( 26 Jul 2020 19:44 )             31.4         Imaging:

## 2020-07-27 NOTE — PROGRESS NOTE ADULT - SUBJECTIVE AND OBJECTIVE BOX
[facility]                                                               Office: [address] [address]                                                              Telephone: [phone]. Fax:[phone]                                                                             PROGRESS NOTE  Reason for follow up: cardiac clearance, CAD  Overnight: No new events.   Update: 7/27: POD#1 s/p left hip surgery. A&O to person, place, time confused to situation.  Denies cardiac complaints.      Review of symptoms:   Cardiac:  No chest pain. No dyspnea. No palpitations.  Respiratory: No cough. No dyspnea  Gastrointestinal: No diarrhea. No abdominal pain. No bleeding.     Past medical history: No updates.   	  Vitals:  T(C): 36.4 (07-27-20 @ 07:40), Max: 37.6 (07-26-20 @ 16:00)  HR: 72 (07-27-20 @ 11:41) (71 - 119)  BP: 104/42 (07-27-20 @ 11:41) (93/59 - 122/93)  RR: 18 (07-27-20 @ 07:40) (16 - 27)  SpO2: 96% (07-27-20 @ 11:41) (95% - 100%)  Wt(kg): --  I&O's Summary    26 Jul 2020 07:01  -  27 Jul 2020 07:00  --------------------------------------------------------  IN: 1705 mL / OUT: 0 mL / NET: 1705 mL      Weight (kg): 78.2 (07-24 @ 18:54)      PHYSICAL EXAM:  Appearance: Comfortable. No acute distress  HEENT:  Head and neck: Atraumatic. Normocephalic.  Normal oral mucosa, PERRL, Neck is supple. No JVD, No carotid bruit.   Neurologic: A&O to person, place, time, confused to situation. no focal deficits. EOMI, Cranial nerves are intact.  Lymphatic: No cervical lymphadenopathy  Cardiovascular: Normal S1 S2, No murmur, rubs/gallops. No JVD, No edema  Respiratory: Lungs clear to auscultation  Gastrointestinal:  Soft, Non-tender, + BS  Lower Extremities: No edema  Psychiatry: Patient is calm. No agitation. Mood & affect appropriate  Skin: No rashes/ecchymoses/cyanosis/ulcers visualized on the face, hands or feet.      CURRENT MEDICATIONS:  metoprolol tartrate 50 milliGRAM(s) Oral every 12 hours    ceFAZolin   IVPB  acetaminophen   Tablet ..  gabapentin  polyethylene glycol 3350  senna  clopidogrel Tablet  heparin   Injectable  lactated ringers.      DIAGNOSTIC TESTING:  [ ] Echocardiogram:   < from: TTE Echo Complete w/o Contrast w/ Doppler (07.25.20 @ 09:34) >  Summary:   1. Technically adequate study.   2. Normal global left ventricular systolic function.   3. Left ventricular ejection fraction, by visual estimation, is 65 to 70%.   4. Spectral Doppler shows impaired relaxation pattern of left ventricular myocardial filling (Grade I diastolic dysfunction).   5. Severely enlarged left atrium.   6. Mild mitral annular calcification.   7. Mild thickening of the anterior and posterior mitral valve leaflets.   8. Mild mitral valve regurgitation.   9. Sclerotic aortic valve with normal opening.  10. Mild aortic regurgitation.  11. Moderate tricuspid regurgitation.  12. There is no evidence of pericardial effusion.    MD Mustapha Electronically signed on 7/25/2020 at 10:52:08 AM    < end of copied text >    [ ]  Catheterization:  [ ] Stress Test:    OTHER: 	      LABS:	 	  CARDIAC MARKERS ( 25 Jul 2020 12:45 )  x     / <0.01 ng/mL / x     / x     / x      p-BNP 25 Jul 2020 12:45: 2827 pg/mL                          9.3    9.68  )-----------( 182      ( 27 Jul 2020 06:04 )             28.8     07-27    138  |  102  |  35.0<H>  ----------------------------<  155<H>  4.1   |  21.0<L>  |  1.46<H>    Ca    7.8<L>      27 Jul 2020 06:04  Phos  4.5     07-27  Mg     1.9     07-27    TPro  4.6<L>  /  Alb  2.8<L>  /  TBili  1.5  /  DBili  x   /  AST  19  /  ALT  10  /  AlkPhos  126<H>  07-26    proBNP: Serum Pro-Brain Natriuretic Peptide: 2827 pg/mL (07-25 @ 12:45)    Lipid Profile:   HgA1c:   TSH: Thyroid Stimulating Hormone, Serum: 0.93 uIU/mL        TELEMETRY: not on CM

## 2020-07-27 NOTE — PHYSICAL THERAPY INITIAL EVALUATION ADULT - LEVEL OF INDEPENDENCE: SIT/STAND, REHAB EVAL
I received a return call from patient.  Patient advised Fit Kit was collected 11/10/19.   I advised patient that the Fit Kit was unable to be processed.   Patient address verified and requesting a new fit kit be mailed to address on file.   I allotted time for questions and concerns re: submission of Fit Kit during telephone encounter and advised patient kindly to please note the collection date on the sample tube with patient understanding verbalized.   
moderate assist (50% patients effort)

## 2020-07-27 NOTE — CONSULT NOTE ADULT - SUBJECTIVE AND OBJECTIVE BOX
86yF was admitted on 07-24    In ED, GCS=15    Patient is a 86y old  Female who presents with a chief complaint of ground level fall, obvious deformity in L hip (27 Jul 2020 13:27)    HPI:  86y Female BIB EMS who had a ground level fall about three hours prior to arrival. Patient thought pain would get better over time but instead it got worse. Patient denies loss of consciousness.    Patient denies fevers/chills, denies lightheadedness/dizziness, denies SOB/chest pain, denies nausea/vomiting, denies constipation/diarrhea.     ----------------------------------------------------------------------------------------    Imaging reviewed showed:  HEAD CT - No acute findings  CT Chest/AP CT - IMPRESSION:   Comminuted left intertrochanteric fracture with displaced fragments. No acute intrathoracic or abdominal injury visualized.   Lobular wall thickening in the distal sigmoid/upper rectum which is highly concerning for neoplasm.  This unsuspected finding was discussed with Dr. Dorantes in the emergency room at 7:55 PM on 7/24/2020. Heterogeneously enhancing nodule left lobe of the thyroid gland. Recommend further evaluation with thyroid ultrasound. Distended bile ducts with large amount of pneumobilia. Correlate with patient's history.  C SPINE CT - Mild degenerative spondylosis. No fracture, dislocation or prevertebral soft tissue swelling of the cervical spine.    REVIEW OF SYSTEMS  Constitutional - No fever, No fatigue  HEENT - No eye pain, No visual disturbances, No difficulty hearing, No tinnitus, No vertigo, No neck pain  Respiratory - No cough, No wheezing, No shortness of breath  Cardiovascular - No chest pain, No palpitations  Gastrointestinal - No abdominal pain, No nausea, No vomiting, No diarrhea, No constipation  Genitourinary - No dysuria, No frequency, No hematuria, No incontinence  Neurological - No headaches, No memory loss, No loss of strength, No numbness, No tremors  Skin - No itching, No rashes, No lesions   Endocrine - No temperature intolerance  Musculoskeletal - Pain at left hip  Psychiatric - No depression, No anxiety    VITALS  T(C): 36.8 (07-27-20 @ 14:01), Max: 37.6 (07-26-20 @ 16:00)  HR: 79 (07-27-20 @ 14:01) (71 - 119)  BP: 109/67 (07-27-20 @ 14:01) (93/59 - 122/93)  RR: 20 (07-27-20 @ 14:01) (16 - 27)  SpO2: 97% (07-27-20 @ 14:01) (95% - 100%)  Wt(kg): --    PAST MEDICAL & SURGICAL HISTORY  CAD S/P stents    SOCIAL HISTORY - as per documentation/history  Smoking - None  EtOH - None  Drugs - None    FUNCTIONAL HISTORY  Lives with son on 1st floor apartment. Patient's daughter lives upstairs. No stairs to enter and no stairs in the apartment.  Independent with ADLs with no assistive devices prior to fall per patient although possibly unreliable historian.     CURRENT FUNCTIONAL STATUS  Cognitive Status Examination:   · Orientation	person; place; situation	  · Level of Consciousness	alert	  · Follows Commands and Answers Questions	100% of the time	  · Personal Safety and Judgment	intact	  · Short Term Memory	intact	    Range of Motion Exam:   · Active Range of Motion Examination	bilateral upper extremity Active ROM was WFL (within functional limits); Right LE Active ROM was WFL (within functional limits); deficits as listed below; Left LE grossly 1/5, pt very weak and with pain with any attempt to move Left LE	    Deep Tendon Reflexes:     · Clonus, Left	absent	  · Clonus, Right	absent	  · Clonus, Bilateral	absent	    Muscle Tone Assessment:   · Muscle Tone Assessment	left-side extremities; right-side extremities; normal	    Bed Mobility: Sit to Supine:     · Level of [place]	maximum assist (25% patients effort)	  · Physical Assist/Nonphysical Assist	2 person assist	    Bed Mobility: Supine to Sit:     · Level of [place]	maximum assist (25% patients effort)	  · Physical Assist/Nonphysical Assist	1 person assist	    Transfer: Sit to Stand:     · Level of [place]	moderate assist (50% patients effort)	  · Physical Assist/Nonphysical Assist	1 person assist	  · Weight-Bearing Restrictions	weight-bearing as tolerated; Left LE	  · Assistive Device	rolling walker	    Transfer: Stand to Sit:     · Level of [place]	moderate assist (50% patients effort)	  · Physical Assist/Nonphysical Assist	1 person assist	  · Weight-Bearing Restrictions	weight-bearing as tolerated; Left LE	  · Assistive Device	rolling walker	    Gait Skills:     · Level of [place]	maximum assist (25% patients effort); 1 steps	  · Physical Assist/Nonphysical Assist	1 person assist	  · Weight-Bearing Restrictions	weight-bearing as tolerated; Left LE	  · Assistive Device	rolling walker	    Gait Analysis:     · Gait Pattern Used	shuffling	  · Gait Deviations Noted	decreased step length; decreased stride length	  · Impairments Contributing to Gait Deviations	decreased ROM; decreased strength; pain	    Stair Negotiation:     · Level of [place]	N/A	    Sensory Examination:    Sensory Examination:    Grossly Intact:   · Gross Sensory Examination	Grossly Intact	    · Balance Skills	good in sitting, fair in standing, pt felt light headed and returned to bed	      Light Touch Sensation:   · Left LE	within normal limits	  · Right LE	within normal limits	    · Coordination Assessed	finger to nose; Intact in bilateral UE	      Proprioception:   · Left LE	within normal limits	  · Right LE	within normal limits	  · Coordination Assessed	finger to nose; Intact in bilateral UE	      FAMILY HISTORY   Noncontributory    RECENT LABS - Reviewed  CBC Full  -  ( 27 Jul 2020 06:04 )  WBC Count : 9.68 K/uL  RBC Count : 2.96 M/uL  Hemoglobin : 9.3 g/dL  Hematocrit : 28.8 %  Platelet Count - Automated : 182 K/uL  Mean Cell Volume : 97.3 fl  Mean Cell Hemoglobin : 31.4 pg  Mean Cell Hemoglobin Concentration : 32.3 gm/dL  Auto Neutrophil # : 7.60 K/uL  Auto Lymphocyte # : 1.21 K/uL  Auto Monocyte # : 0.66 K/uL  Auto Eosinophil # : 0.15 K/uL  Auto Basophil # : 0.01 K/uL  Auto Neutrophil % : 78.6 %  Auto Lymphocyte % : 12.5 %  Auto Monocyte % : 6.8 %  Auto Eosinophil % : 1.5 %  Auto Basophil % : 0.1 %    07-27    138  |  102  |  35.0<H>  ----------------------------<  155<H>  4.1   |  21.0<L>  |  1.46<H>    Ca    7.8<L>      27 Jul 2020 06:04  Phos  4.5     07-27  Mg     1.9     07-27    TPro  4.6<L>  /  Alb  2.8<L>  /  TBili  1.5  /  DBili  x   /  AST  19  /  ALT  10  /  AlkPhos  126<H>  07-26        ALLERGIES  No Known Allergies      MEDICATIONS   acetaminophen   Tablet .. 650 milliGRAM(s) Oral every 6 hours  ceFAZolin   IVPB 2000 milliGRAM(s) IV Intermittent once  clopidogrel Tablet 75 milliGRAM(s) Oral daily  gabapentin 300 milliGRAM(s) Oral every 8 hours  heparin   Injectable 5000 Unit(s) SubCutaneous every 8 hours  lactated ringers. 1000 milliLiter(s) IV Continuous <Continuous>  metoprolol tartrate 50 milliGRAM(s) Oral every 12 hours  ondansetron Injectable 4 milliGRAM(s) IV Push every 6 hours PRN  ondansetron Injectable 4 milliGRAM(s) IV Push once PRN  polyethylene glycol 3350 17 Gram(s) Oral daily  senna 2 Tablet(s) Oral at bedtime  traMADol 25 milliGRAM(s) Oral every 6 hours PRN  traMADol 50 milliGRAM(s) Oral every 6 hours PRN      ----------------------------------------------------------------------------------------  PHYSICAL EXAM  Constitutional - NAD, Comfortable  HEENT - NCAT, EOMI  Neck - Supple, No limited ROM  Chest - Breathing comfortably, No wheezing  Cardiovascular - S1S2   Abdomen - Soft, NT/ND  Extremities - No calf tenderness, mild edema in B/L LE, tenderness to palpation of left hip, tenderness with active and passive range of motion. Vertical scar over right knee.  Neurologic Exam -                    Cognitive - Awake, Alert, AAO to self, place, situation. Does not know the date or year     Communication - Fluent, No dysarthria     Cranial Nerves - CN 2-12 intact     Motor - No focal deficits                    LEFT    UE - ShAB 4/5, EF 4/5, EE 4/5, WE 4/5,  4/5                    RIGHT UE - ShAB 4/5, EF 4/5, EE 4/5, WE 4/5,  4/5                    LEFT    LE - HF 0/5, KE 1/5, DF 3/5, PF 3/5                    RIGHT LE - HF 4/5, KE 4/5, DF 4/5, PF 4/5        Sensory - Intact to LT     Reflexes - DTR Intact, No primitive reflexive     Coordination - FTN intact     OculoVestibular - No saccades, No nystagmus, VOR         Balance - WNL Static when sitting up  Psychiatric - Mood stable, Affect WNL  ----------------------------------------------------------------------------------------  ASSESSMENT/PLAN  86yFemale with functional deficits after left intertrochanteric fracture S/P IM nail 7/26/20  Pain - Tylenol  DVT PPX - Heparin, SCDs  Rehab -    Recommend ACUTE inpatient rehabilitation for the functional deficits consisting of 3 hours of therapy/day & 24 hour RN/daily PMR physician for comorbid medical management. HOWEVER, final recommendations pending continued PT while inpatient and progress with PT, may require ROSALINO.    Will continue to follow. Functional progress will determine ongoing rehab dispo recommendations, which may change.   Continue bedside therapy as well as OOB throughout the day with mobilization by staff to maintain cardiopulmonary function and prevention of secondary complications related to debility. 86yF was admitted on 07-24    In ED, GCS=15    Patient is a 86y old  Female who presents with a chief complaint of ground level fall, obvious deformity in L hip (27 Jul 2020 13:27)    HPI:  86y Female BIB EMS who had a ground level fall about three hours prior to arrival. Patient thought pain would get better over time but instead it got worse. Patient denies loss of consciousness.    Patient denies fevers/chills, denies lightheadedness/dizziness, denies SOB/chest pain, denies nausea/vomiting, denies constipation/diarrhea.     ----------------------------------------------------------------------------------------    Imaging reviewed showed:  HEAD CT - No acute findings  CT Chest/AP CT - IMPRESSION:   Comminuted left intertrochanteric fracture with displaced fragments. No acute intrathoracic or abdominal injury visualized.   Lobular wall thickening in the distal sigmoid/upper rectum which is highly concerning for neoplasm.  This unsuspected finding was discussed with Dr. Dorantes in the emergency room at 7:55 PM on 7/24/2020. Heterogeneously enhancing nodule left lobe of the thyroid gland. Recommend further evaluation with thyroid ultrasound. Distended bile ducts with large amount of pneumobilia. Correlate with patient's history.  C SPINE CT - Mild degenerative spondylosis. No fracture, dislocation or prevertebral soft tissue swelling of the cervical spine.    REVIEW OF SYSTEMS  Constitutional - No fever, No fatigue  HEENT - No eye pain, No visual disturbances, No difficulty hearing, No tinnitus, No vertigo, No neck pain  Respiratory - No cough, No wheezing, No shortness of breath  Cardiovascular - No chest pain, No palpitations  Gastrointestinal - No abdominal pain, No nausea, No vomiting, No diarrhea, No constipation  Genitourinary - No dysuria, No frequency, No hematuria, No incontinence  Neurological - No headaches, No memory loss, No loss of strength, No numbness, No tremors  Skin - No itching, No rashes, No lesions   Endocrine - No temperature intolerance  Musculoskeletal - Pain at left hip  Psychiatric - No depression, No anxiety    VITALS  T(C): 36.8 (07-27-20 @ 14:01), Max: 37.6 (07-26-20 @ 16:00)  HR: 79 (07-27-20 @ 14:01) (71 - 119)  BP: 109/67 (07-27-20 @ 14:01) (93/59 - 122/93)  RR: 20 (07-27-20 @ 14:01) (16 - 27)  SpO2: 97% (07-27-20 @ 14:01) (95% - 100%)  Wt(kg): --    PAST MEDICAL & SURGICAL HISTORY  CAD S/P stents    SOCIAL HISTORY - as per documentation/history  Smoking - None  EtOH - None  Drugs - None    FUNCTIONAL HISTORY  Lives with son on 1st floor apartment. Patient's daughter lives upstairs. No stairs to enter and no stairs in the apartment.  Independent with ADLs with no assistive devices prior to fall per patient although possibly unreliable historian.     CURRENT FUNCTIONAL STATUS  Cognitive Status Examination:   · Orientation	person; place; situation	  · Level of Consciousness	alert	  · Follows Commands and Answers Questions	100% of the time	  · Personal Safety and Judgment	intact	  · Short Term Memory	intact	    Range of Motion Exam:   · Active Range of Motion Examination	bilateral upper extremity Active ROM was WFL (within functional limits); Right LE Active ROM was WFL (within functional limits); deficits as listed below; Left LE grossly 1/5, pt very weak and with pain with any attempt to move Left LE	    Deep Tendon Reflexes:     · Clonus, Left	absent	  · Clonus, Right	absent	  · Clonus, Bilateral	absent	    Muscle Tone Assessment:   · Muscle Tone Assessment	left-side extremities; right-side extremities; normal	    Bed Mobility: Sit to Supine:     · Level of [place]	maximum assist (25% patients effort)	  · Physical Assist/Nonphysical Assist	2 person assist	    Bed Mobility: Supine to Sit:     · Level of [place]	maximum assist (25% patients effort)	  · Physical Assist/Nonphysical Assist	1 person assist	    Transfer: Sit to Stand:     · Level of [place]	moderate assist (50% patients effort)	  · Physical Assist/Nonphysical Assist	1 person assist	  · Weight-Bearing Restrictions	weight-bearing as tolerated; Left LE	  · Assistive Device	rolling walker	    Transfer: Stand to Sit:     · Level of [place]	moderate assist (50% patients effort)	  · Physical Assist/Nonphysical Assist	1 person assist	  · Weight-Bearing Restrictions	weight-bearing as tolerated; Left LE	  · Assistive Device	rolling walker	    Gait Skills:     · Level of [place]	maximum assist (25% patients effort); 1 steps	  · Physical Assist/Nonphysical Assist	1 person assist	  · Weight-Bearing Restrictions	weight-bearing as tolerated; Left LE	  · Assistive Device	rolling walker	    Gait Analysis:     · Gait Pattern Used	shuffling	  · Gait Deviations Noted	decreased step length; decreased stride length	  · Impairments Contributing to Gait Deviations	decreased ROM; decreased strength; pain	    Stair Negotiation:     · Level of [place]	N/A	    Sensory Examination:    Sensory Examination:    Grossly Intact:   · Gross Sensory Examination	Grossly Intact	    · Balance Skills	good in sitting, fair in standing, pt felt light headed and returned to bed	      Light Touch Sensation:   · Left LE	within normal limits	  · Right LE	within normal limits	    · Coordination Assessed	finger to nose; Intact in bilateral UE	      Proprioception:   · Left LE	within normal limits	  · Right LE	within normal limits	  · Coordination Assessed	finger to nose; Intact in bilateral UE	      FAMILY HISTORY   Noncontributory    RECENT LABS - Reviewed  CBC Full  -  ( 27 Jul 2020 06:04 )  WBC Count : 9.68 K/uL  RBC Count : 2.96 M/uL  Hemoglobin : 9.3 g/dL  Hematocrit : 28.8 %  Platelet Count - Automated : 182 K/uL  Mean Cell Volume : 97.3 fl  Mean Cell Hemoglobin : 31.4 pg  Mean Cell Hemoglobin Concentration : 32.3 gm/dL  Auto Neutrophil # : 7.60 K/uL  Auto Lymphocyte # : 1.21 K/uL  Auto Monocyte # : 0.66 K/uL  Auto Eosinophil # : 0.15 K/uL  Auto Basophil # : 0.01 K/uL  Auto Neutrophil % : 78.6 %  Auto Lymphocyte % : 12.5 %  Auto Monocyte % : 6.8 %  Auto Eosinophil % : 1.5 %  Auto Basophil % : 0.1 %    07-27    138  |  102  |  35.0<H>  ----------------------------<  155<H>  4.1   |  21.0<L>  |  1.46<H>    Ca    7.8<L>      27 Jul 2020 06:04  Phos  4.5     07-27  Mg     1.9     07-27    TPro  4.6<L>  /  Alb  2.8<L>  /  TBili  1.5  /  DBili  x   /  AST  19  /  ALT  10  /  AlkPhos  126<H>  07-26        ALLERGIES  No Known Allergies      MEDICATIONS   acetaminophen   Tablet .. 650 milliGRAM(s) Oral every 6 hours  ceFAZolin   IVPB 2000 milliGRAM(s) IV Intermittent once  clopidogrel Tablet 75 milliGRAM(s) Oral daily  gabapentin 300 milliGRAM(s) Oral every 8 hours  heparin   Injectable 5000 Unit(s) SubCutaneous every 8 hours  lactated ringers. 1000 milliLiter(s) IV Continuous <Continuous>  metoprolol tartrate 50 milliGRAM(s) Oral every 12 hours  ondansetron Injectable 4 milliGRAM(s) IV Push every 6 hours PRN  ondansetron Injectable 4 milliGRAM(s) IV Push once PRN  polyethylene glycol 3350 17 Gram(s) Oral daily  senna 2 Tablet(s) Oral at bedtime  traMADol 25 milliGRAM(s) Oral every 6 hours PRN  traMADol 50 milliGRAM(s) Oral every 6 hours PRN      ----------------------------------------------------------------------------------------  PHYSICAL EXAM  Constitutional - NAD, Comfortable  HEENT - NCAT, EOMI  Neck - Supple, No limited ROM  Chest - Breathing comfortably, No wheezing  Cardiovascular - S1S2   Abdomen - Soft, NT/ND  Extremities - No calf tenderness, mild edema in B/L LE, tenderness to palpation of left hip, tenderness with active and passive range of motion. Vertical scar over right knee.  Neurologic Exam -                    Cognitive - Awake, Alert, AAO to self, place, situation. Does not know the date or year     Communication - Fluent, No dysarthria     Cranial Nerves - CN 2-12 intact     Motor - No focal deficits                    LEFT    UE - ShAB 4/5, EF 4/5, EE 4/5, WE 4/5,  4/5                    RIGHT UE - ShAB 4/5, EF 4/5, EE 4/5, WE 4/5,  4/5                    LEFT    LE - HF 0/5, KE 1/5, DF 3/5, PF 3/5                    RIGHT LE - HF 4/5, KE 4/5, DF 4/5, PF 4/5        Sensory - Intact to LT     Reflexes - DTR Intact, No primitive reflexive     Coordination - FTN intact     OculoVestibular - No saccades, No nystagmus, VOR         Balance - WNL Static when sitting up  Psychiatric - Mood stable, Affect WNL  ----------------------------------------------------------------------------------------  ASSESSMENT/PLAN  86yFemale with functional deficits after left intertrochanteric fracture S/P IM nail 7/26/20  Pain - Tylenol, Tramadol prn  DVT PPX - Heparin, SCDs  H/o CAD S/P stents - continue Aspirin and Plavix.  Thickened rectum and sigmoid (incidental finding on CT abd/pelvis) - GI consulted and recommends outpatient colonoscopy.  Rehab -    Recommend ACUTE inpatient rehabilitation for the functional deficits consisting of 3 hours of therapy/day & 24 hour RN/daily PMR physician for comorbid medical management. HOWEVER, final recommendations pending continued PT while inpatient and progress with PT, may require ROSALINO.    Will continue to follow. Functional progress will determine ongoing rehab dispo recommendations, which may change.   Continue bedside therapy as well as OOB throughout the day with mobilization by staff to maintain cardiopulmonary function and prevention of secondary complications related to debility.

## 2020-07-27 NOTE — PROGRESS NOTE ADULT - ASSESSMENT
Assessment:  87 y/o F s/p left hip IM nail. Pain regimen was optimized post-op for better pain control. Will consider restarting patient on Plavix and Aspirin after H+H results.     -DVT ppx   - F/U PT recommendations   - pain control   - F/U H+H

## 2020-07-28 LAB
ANION GAP SERPL CALC-SCNC: 14 MMOL/L — SIGNIFICANT CHANGE UP (ref 5–17)
BUN SERPL-MCNC: 40 MG/DL — HIGH (ref 8–20)
CALCIUM SERPL-MCNC: 8.1 MG/DL — LOW (ref 8.6–10.2)
CHLORIDE SERPL-SCNC: 104 MMOL/L — SIGNIFICANT CHANGE UP (ref 98–107)
CO2 SERPL-SCNC: 22 MMOL/L — SIGNIFICANT CHANGE UP (ref 22–29)
CREAT SERPL-MCNC: 1.52 MG/DL — HIGH (ref 0.5–1.3)
GLUCOSE SERPL-MCNC: 132 MG/DL — HIGH (ref 70–99)
HCT VFR BLD CALC: 29.7 % — LOW (ref 34.5–45)
HGB BLD-MCNC: 9.5 G/DL — LOW (ref 11.5–15.5)
INR BLD: 1.07 RATIO — SIGNIFICANT CHANGE UP (ref 0.88–1.16)
MAGNESIUM SERPL-MCNC: 2.3 MG/DL — SIGNIFICANT CHANGE UP (ref 1.6–2.6)
MCHC RBC-ENTMCNC: 31.3 PG — SIGNIFICANT CHANGE UP (ref 27–34)
MCHC RBC-ENTMCNC: 32 GM/DL — SIGNIFICANT CHANGE UP (ref 32–36)
MCV RBC AUTO: 97.7 FL — SIGNIFICANT CHANGE UP (ref 80–100)
PHOSPHATE SERPL-MCNC: 3.5 MG/DL — SIGNIFICANT CHANGE UP (ref 2.4–4.7)
PLATELET # BLD AUTO: 142 K/UL — LOW (ref 150–400)
POTASSIUM SERPL-MCNC: 4 MMOL/L — SIGNIFICANT CHANGE UP (ref 3.5–5.3)
POTASSIUM SERPL-SCNC: 4 MMOL/L — SIGNIFICANT CHANGE UP (ref 3.5–5.3)
PROTHROM AB SERPL-ACNC: 12.4 SEC — SIGNIFICANT CHANGE UP (ref 10.6–13.6)
RBC # BLD: 3.04 M/UL — LOW (ref 3.8–5.2)
RBC # FLD: 14 % — SIGNIFICANT CHANGE UP (ref 10.3–14.5)
SODIUM SERPL-SCNC: 140 MMOL/L — SIGNIFICANT CHANGE UP (ref 135–145)
WBC # BLD: 6.72 K/UL — SIGNIFICANT CHANGE UP (ref 3.8–10.5)
WBC # FLD AUTO: 6.72 K/UL — SIGNIFICANT CHANGE UP (ref 3.8–10.5)

## 2020-07-28 PROCEDURE — 99231 SBSQ HOSP IP/OBS SF/LOW 25: CPT

## 2020-07-28 PROCEDURE — 99233 SBSQ HOSP IP/OBS HIGH 50: CPT | Mod: GC

## 2020-07-28 RX ORDER — GABAPENTIN 400 MG/1
400 CAPSULE ORAL AT BEDTIME
Refills: 0 | Status: DISCONTINUED | OUTPATIENT
Start: 2020-07-28 | End: 2020-07-30

## 2020-07-28 RX ORDER — SODIUM CHLORIDE 9 MG/ML
500 INJECTION INTRAMUSCULAR; INTRAVENOUS; SUBCUTANEOUS ONCE
Refills: 0 | Status: DISCONTINUED | OUTPATIENT
Start: 2020-07-28 | End: 2020-07-30

## 2020-07-28 RX ORDER — ACETAMINOPHEN 500 MG
975 TABLET ORAL EVERY 6 HOURS
Refills: 0 | Status: DISCONTINUED | OUTPATIENT
Start: 2020-07-28 | End: 2020-07-30

## 2020-07-28 RX ADMIN — Medication 650 MILLIGRAM(S): at 05:47

## 2020-07-28 RX ADMIN — GABAPENTIN 300 MILLIGRAM(S): 400 CAPSULE ORAL at 05:03

## 2020-07-28 RX ADMIN — Medication 975 MILLIGRAM(S): at 23:38

## 2020-07-28 RX ADMIN — Medication 975 MILLIGRAM(S): at 17:43

## 2020-07-28 RX ADMIN — POLYETHYLENE GLYCOL 3350 17 GRAM(S): 17 POWDER, FOR SOLUTION ORAL at 11:46

## 2020-07-28 RX ADMIN — SENNA PLUS 2 TABLET(S): 8.6 TABLET ORAL at 22:13

## 2020-07-28 RX ADMIN — GABAPENTIN 400 MILLIGRAM(S): 400 CAPSULE ORAL at 22:12

## 2020-07-28 RX ADMIN — Medication 650 MILLIGRAM(S): at 00:20

## 2020-07-28 RX ADMIN — Medication 975 MILLIGRAM(S): at 12:40

## 2020-07-28 RX ADMIN — CLOPIDOGREL BISULFATE 75 MILLIGRAM(S): 75 TABLET, FILM COATED ORAL at 11:46

## 2020-07-28 RX ADMIN — Medication 975 MILLIGRAM(S): at 18:40

## 2020-07-28 RX ADMIN — Medication 50 MILLIGRAM(S): at 05:04

## 2020-07-28 RX ADMIN — Medication 650 MILLIGRAM(S): at 05:03

## 2020-07-28 RX ADMIN — Medication 650 MILLIGRAM(S): at 01:20

## 2020-07-28 RX ADMIN — HEPARIN SODIUM 5000 UNIT(S): 5000 INJECTION INTRAVENOUS; SUBCUTANEOUS at 14:20

## 2020-07-28 RX ADMIN — HEPARIN SODIUM 5000 UNIT(S): 5000 INJECTION INTRAVENOUS; SUBCUTANEOUS at 05:03

## 2020-07-28 RX ADMIN — SODIUM CHLORIDE 75 MILLILITER(S): 9 INJECTION, SOLUTION INTRAVENOUS at 23:37

## 2020-07-28 RX ADMIN — HEPARIN SODIUM 5000 UNIT(S): 5000 INJECTION INTRAVENOUS; SUBCUTANEOUS at 22:13

## 2020-07-28 RX ADMIN — SODIUM CHLORIDE 75 MILLILITER(S): 9 INJECTION, SOLUTION INTRAVENOUS at 00:20

## 2020-07-28 RX ADMIN — Medication 975 MILLIGRAM(S): at 11:46

## 2020-07-28 NOTE — PROGRESS NOTE ADULT - SUBJECTIVE AND OBJECTIVE BOX
ORTHOPEDIC POST-OP PROGRESS NOTE:    Name: GUSTABO DIGGS    MR #: [id number]    Procedure: Intramedulary nail of Left hip      DOS: 7/26, POD#2      Pt comfortable without complaints, pain controlled. Denies CP, SOB, N/V, numbness/tingling                             9.5    6.72  )-----------( 142      ( 28 Jul 2020 06:21 )             29.7       28 Jul 2020 06:21    140    |  104    |  40.0   ----------------------------<  132    4.0     |  22.0   |  1.52     Ca    8.1        28 Jul 2020 06:21  Phos  3.5       28 Jul 2020 06:21  Mg     2.3       28 Jul 2020 06:21        Vital Signs Last 24 Hrs  T(C): 36.4 (07-28-20 @ 04:59), Max: 36.4 (07-28-20 @ 04:59)  T(F): 97.6 (07-28-20 @ 04:59), Max: 97.6 (07-28-20 @ 04:59)  HR: 95 (07-28-20 @ 04:59) (95 - 95)  BP: 111/63 (07-28-20 @ 04:59) (111/63 - 111/63)  BP(mean): --  RR: 16 (07-28-20 @ 04:59) (16 - 16)  SpO2: 98% (07-28-20 @ 04:59) (98% - 98%)      General Exam:    General: comfortable in bed    Dressings C/D/I. No bleeding. No erythema.    Skin: No erythema. No wound dehiscence    Pulses: 2+ dorsalis pedis pulse. Cap refill < 2 sec.    Sensation: Grossly intact to light touch without deficit.    Motor: No motor weaknesses found.        A/P: 86y Female  POD# 2 s/p Left hip IM nail    - Stable  - Pain Control  - DVT ppx as prescribed  - PT eval, WBAT  - Weight bearing status: as tolerated

## 2020-07-28 NOTE — PROGRESS NOTE ADULT - ASSESSMENT
87yo male sp ground level fall admitted with Left intertrochanteric fracture now sp 7/26/2020 Left IMN    Postoperative course complicated by NIKOLAI, acute blood loss anemia (sp 1U PRBCs)    Continue DASH diet, IVF until creatinine resolved  Continue HSQ, Plavix    Dispo to Rehab likely 1-2days pending stable Hemoglobin and resolution of NIKOLAI

## 2020-07-28 NOTE — PROGRESS NOTE ADULT - SUBJECTIVE AND OBJECTIVE BOX
INTERVAL HPI/OVERNIGHT EVENTS: No acute overnight.  Tolerating diet.  Urinating independently.  Denies fevers, chills, nausea, emesis.  Denies chest pain, dyspnea.  Denies constipation, diarrhea. Denies headaches, dizziness, changes in vision.     MEDICATIONS  (STANDING):  acetaminophen   Tablet .. 650 milliGRAM(s) Oral every 6 hours  ceFAZolin   IVPB 2000 milliGRAM(s) IV Intermittent once  clopidogrel Tablet 75 milliGRAM(s) Oral daily  gabapentin 300 milliGRAM(s) Oral every 8 hours  heparin   Injectable 5000 Unit(s) SubCutaneous every 8 hours  lactated ringers. 1000 milliLiter(s) (75 mL/Hr) IV Continuous <Continuous>  metoprolol tartrate 50 milliGRAM(s) Oral every 12 hours  polyethylene glycol 3350 17 Gram(s) Oral daily  senna 2 Tablet(s) Oral at bedtime    MEDICATIONS  (PRN):  ondansetron Injectable 4 milliGRAM(s) IV Push every 6 hours PRN Nausea and/or Vomiting  ondansetron Injectable 4 milliGRAM(s) IV Push once PRN Nausea and/or Vomiting  traMADol 25 milliGRAM(s) Oral every 6 hours PRN Moderate Pain (4 - 6)  traMADol 50 milliGRAM(s) Oral every 6 hours PRN Severe Pain (7 - 10)      Vital Signs Last 24 Hrs  T(C): 36.4 (28 Jul 2020 04:59), Max: 36.9 (27 Jul 2020 14:26)  T(F): 97.6 (28 Jul 2020 04:59), Max: 98.4 (27 Jul 2020 14:26)  HR: 95 (28 Jul 2020 04:59) (72 - 100)  BP: 111/63 (28 Jul 2020 04:59) (93/59 - 113/65)  BP(mean): --  RR: 16 (28 Jul 2020 04:59) (16 - 20)  SpO2: 98% (28 Jul 2020 04:59) (95% - 98%)    Constitutional: NAD, well-groomed, well-developed  HEENT: PERRLA, EOMI, no drainage or redness  Respiratory: Breath Sounds equal & clear to percussion & auscultation, no accessory muscle use  Cardiovascular: Regular rate & rhythm, normal S1, S2; no murmurs, gallops or rubs; no S3, S4  Gastrointestinal: Soft, non-tender, no hepatosplenomegaly, normal bowel sounds  Extremities: No peripheral edema, No cyanosis, clubbing.  LLE surgical dressing CDI without strikethrough  Vascular: Equal and normal pulses: 2+ peripheral pulses throughout  Neurological: GCS: 14 (E4) A&O x 2 (person, time); no sensory, motor or coordination deficits, normal reflexes  Psychiatric: Normal mood, normal affect  Musculoskeletal: No joint pain, swelling or deformity; no limitation of movement  Skin: No rashes      I&O's Detail    27 Jul 2020 07:01  -  28 Jul 2020 07:00  --------------------------------------------------------  IN:    lactated ringers.: 1500 mL    Packed Red Blood Cells: 312 mL  Total IN: 1812 mL    OUT:  Total OUT: 0 mL    Total NET: 1812 mL          LABS:                        9.5    6.72  )-----------( 142      ( 28 Jul 2020 06:21 )             29.7     07-28    140  |  104  |  40.0<H>  ----------------------------<  132<H>  4.0   |  22.0  |  1.52<H>    Ca    8.1<L>      28 Jul 2020 06:21  Phos  3.5     07-28  Mg     2.3     07-28      PT/INR - ( 28 Jul 2020 06:21 )   PT: 12.4 sec;   INR: 1.07 ratio               RADIOLOGY & ADDITIONAL STUDIES:

## 2020-07-28 NOTE — PROGRESS NOTE ADULT - SUBJECTIVE AND OBJECTIVE BOX
INTERVAL EVENTS  Patient feels well, no complaints. Denies pain at left hip at rest. Patient reports she slept well.    S/P 1U pRBCs for symptomatic anemia    FUNCTIONAL PROGRESS  Physical Therapy 7/28/20:  Bed Mobility  Bed Mobility Training Sit-to-Supine: maximum assist (25% patient effort);  1 person assist  Bed Mobility Training Supine-to-Sit: maximum assist (25% patient effort);  1 person assist    Overall Progress Summary    Progress Summary: Chart reviewed, contents noted.  Pt received supine in bed, NAD and agreeable to participate in PT treatment.  Focus of treatment was functional mobility including bed mobility, transfers,   Pt was unable to sit or stand safely.  returned to bed per RN request.  BP is low.  Will reattempt. Pt in bed due to safety concerns. + bed alarm.      REVIEW OF SYSTEMS  Constitutional - No fever,  No fatigue  HEENT - No vertigo, No neck pain  Neurological - No headaches, No memory loss, No loss of strength, No numbness, No tremors  Skin - No rashes, No lesions   Musculoskeletal - +pain at left hip with movement  Psychiatric - No depression, No anxiety    VITALS  T(C): 36.4 (07-28-20 @ 08:20), Max: 36.9 (07-27-20 @ 14:26)  HR: 71 (07-28-20 @ 08:20) (71 - 100)  BP: 86/56 (07-28-20 @ 08:20) (86/56 - 113/65)  RR: 17 (07-28-20 @ 08:20) (16 - 20)  SpO2: 95% (07-28-20 @ 08:20) (95% - 98%)  Wt(kg): --    MEDICATIONS   acetaminophen   Tablet .. 975 milliGRAM(s) every 6 hours  ceFAZolin   IVPB 2000 milliGRAM(s) once  clopidogrel Tablet 75 milliGRAM(s) daily  gabapentin 400 milliGRAM(s) at bedtime  heparin   Injectable 5000 Unit(s) every 8 hours  lactated ringers. 1000 milliLiter(s) <Continuous>  metoprolol tartrate 50 milliGRAM(s) every 12 hours  ondansetron Injectable 4 milliGRAM(s) every 6 hours PRN  ondansetron Injectable 4 milliGRAM(s) once PRN  polyethylene glycol 3350 17 Gram(s) daily  senna 2 Tablet(s) at bedtime  traMADol 25 milliGRAM(s) every 6 hours PRN  traMADol 50 milliGRAM(s) every 6 hours PRN      RECENT LABS/IMAGING  CBC Full  -  ( 28 Jul 2020 06:21 )  WBC Count : 6.72 K/uL  RBC Count : 3.04 M/uL  Hemoglobin : 9.5 g/dL  Hematocrit : 29.7 %  Platelet Count - Automated : 142 K/uL  Mean Cell Volume : 97.7 fl  Mean Cell Hemoglobin : 31.3 pg  Mean Cell Hemoglobin Concentration : 32.0 gm/dL  Auto Neutrophil # : x  Auto Lymphocyte # : x  Auto Monocyte # : x  Auto Eosinophil # : x  Auto Basophil # : x  Auto Neutrophil % : x  Auto Lymphocyte % : x  Auto Monocyte % : x  Auto Eosinophil % : x  Auto Basophil % : x    07-28    140  |  104  |  40.0<H>  ----------------------------<  132<H>  4.0   |  22.0  |  1.52<H>    Ca    8.1<L>      28 Jul 2020 06:21  Phos  3.5     07-28  Mg     2.3     07-28          ---------  PHYSICAL EXAM  Constitutional - NAD, Comfortable  Extremities - +tenderness to palpation and passive range of motion of left hip, No calf tenderness  Neurologic Exam -                    Cognitive - AAOx2 to self and place     Communication - Fluent     Motor - hip flexion 1/5 muscle strength, moving all extremities     Sensory - Intact to LT  Psychiatric - Mood WNL, Affect WNL    ASSESSMENT/PLAN  86y Female with functional deficits after left intertrochanteric fracture S/P IM nail 7/26/20  H/o CAD S/P stents - continue Aspirin and Plavix.  Thickened rectum and sigmoid (incidental finding on CT abd/pelvis) - GI consulted and recommends outpatient colonoscopy.  Pain - Tylenol 975mg Q6hr (increased from 650mg Q6hr), Gabapentin 400mg QHS (decreased from 300mg TID), Tramadol prn  DVT PPX - heparin, SCDs  Rehab -    Recommend [facility], patient DOES NOT meet acute inpatient rehabilitation criteria.   Continue bedside therapy as well as OOB throughout the day with mobilization by staff to maintain cardiopulmonary function and prevention of secondary complications related to debility. INTERVAL EVENTS  Patient feels well, no complaints. Denies pain at left hip at rest. Patient reports she slept well.    S/P 1U pRBCs for symptomatic anemia    FUNCTIONAL PROGRESS  Physical Therapy 7/28/20:  Bed Mobility  Bed Mobility Training Sit-to-Supine: maximum assist (25% patient effort);  1 person assist  Bed Mobility Training Supine-to-Sit: maximum assist (25% patient effort);  1 person assist    Overall Progress Summary    Progress Summary: Chart reviewed, contents noted.  Pt received supine in bed, NAD and agreeable to participate in PT treatment.  Focus of treatment was functional mobility including bed mobility, transfers,   Pt was unable to sit or stand safely.  returned to bed per RN request.  BP is low.  Will reattempt. Pt in bed due to safety concerns. + bed alarm.      REVIEW OF SYSTEMS  Constitutional - No fever,  No fatigue  HEENT - No vertigo, No neck pain  Neurological - No headaches, No memory loss, No loss of strength, No numbness, No tremors  Skin - No rashes, No lesions   Musculoskeletal - +pain at left hip with movement  Psychiatric - No depression, No anxiety    VITALS  T(C): 36.4 (07-28-20 @ 08:20), Max: 36.9 (07-27-20 @ 14:26)  HR: 71 (07-28-20 @ 08:20) (71 - 100)  BP: 86/56 (07-28-20 @ 08:20) (86/56 - 113/65)  RR: 17 (07-28-20 @ 08:20) (16 - 20)  SpO2: 95% (07-28-20 @ 08:20) (95% - 98%)  Wt(kg): --    MEDICATIONS   acetaminophen   Tablet .. 975 milliGRAM(s) every 6 hours  ceFAZolin   IVPB 2000 milliGRAM(s) once  clopidogrel Tablet 75 milliGRAM(s) daily  gabapentin 400 milliGRAM(s) at bedtime  heparin   Injectable 5000 Unit(s) every 8 hours  lactated ringers. 1000 milliLiter(s) <Continuous>  metoprolol tartrate 50 milliGRAM(s) every 12 hours  ondansetron Injectable 4 milliGRAM(s) every 6 hours PRN  ondansetron Injectable 4 milliGRAM(s) once PRN  polyethylene glycol 3350 17 Gram(s) daily  senna 2 Tablet(s) at bedtime  traMADol 25 milliGRAM(s) every 6 hours PRN  traMADol 50 milliGRAM(s) every 6 hours PRN      RECENT LABS/IMAGING  CBC Full  -  ( 28 Jul 2020 06:21 )  WBC Count : 6.72 K/uL  RBC Count : 3.04 M/uL  Hemoglobin : 9.5 g/dL  Hematocrit : 29.7 %  Platelet Count - Automated : 142 K/uL  Mean Cell Volume : 97.7 fl  Mean Cell Hemoglobin : 31.3 pg  Mean Cell Hemoglobin Concentration : 32.0 gm/dL  Auto Neutrophil # : x  Auto Lymphocyte # : x  Auto Monocyte # : x  Auto Eosinophil # : x  Auto Basophil # : x  Auto Neutrophil % : x  Auto Lymphocyte % : x  Auto Monocyte % : x  Auto Eosinophil % : x  Auto Basophil % : x    07-28    140  |  104  |  40.0<H>  ----------------------------<  132<H>  4.0   |  22.0  |  1.52<H>    Ca    8.1<L>      28 Jul 2020 06:21  Phos  3.5     07-28  Mg     2.3     07-28        HEAD CT - No acute findings  CT Chest/AP CT - Comminuted left intertrochanteric fracture with displaced fragments. No acute intrathoracic or abdominal injury visualized.     ---------  PHYSICAL EXAM  Constitutional - NAD, Comfortable  Extremities - +tenderness to palpation and passive range of motion of left hip, No calf tenderness  Neurologic Exam -                    Cognitive - AAOx2 to self and place     Communication - Fluent     Motor - hip flexion 1/5 muscle strength, moving all extremities     Sensory - Intact to LT  Psychiatric - Mood WNL, Affect WNL    ASSESSMENT/PLAN  86y Female with functional deficits after left intertrochanteric fracture S/P IM nail 7/26/20  H/o CAD S/P stents - continue Aspirin and Plavix.  Thickened rectum and sigmoid (incidental finding on CT abd/pelvis) - GI consulted and recommends outpatient colonoscopy.  Pain - Tylenol 975mg Q6hr (increased from 650mg Q6hr), Gabapentin 400mg QHS (decreased from 300mg TID), Tramadol prn -- patient quite fatigued  DVT PPX - heparin, SCDs  Rehab -    Recommend [facility], patient DOES NOT meet acute inpatient rehabilitation criteria.   Continue bedside therapy as well as OOB throughout the day with mobilization by staff to maintain cardiopulmonary function and prevention of secondary complications related to debility.

## 2020-07-29 LAB
ANION GAP SERPL CALC-SCNC: 12 MMOL/L — SIGNIFICANT CHANGE UP (ref 5–17)
APPEARANCE UR: CLEAR — SIGNIFICANT CHANGE UP
BACTERIA # UR AUTO: NEGATIVE — SIGNIFICANT CHANGE UP
BASOPHILS # BLD AUTO: 0.01 K/UL — SIGNIFICANT CHANGE UP (ref 0–0.2)
BASOPHILS NFR BLD AUTO: 0.2 % — SIGNIFICANT CHANGE UP (ref 0–2)
BILIRUB UR-MCNC: NEGATIVE — SIGNIFICANT CHANGE UP
BLD GP AB SCN SERPL QL: SIGNIFICANT CHANGE UP
BUN SERPL-MCNC: 39 MG/DL — HIGH (ref 8–20)
CALCIUM SERPL-MCNC: 7.7 MG/DL — LOW (ref 8.6–10.2)
CHLORIDE SERPL-SCNC: 105 MMOL/L — SIGNIFICANT CHANGE UP (ref 98–107)
CO2 SERPL-SCNC: 20 MMOL/L — LOW (ref 22–29)
COLOR SPEC: YELLOW — SIGNIFICANT CHANGE UP
CREAT ?TM UR-MCNC: 119 MG/DL — SIGNIFICANT CHANGE UP
CREAT SERPL-MCNC: 1.39 MG/DL — HIGH (ref 0.5–1.3)
DIFF PNL FLD: ABNORMAL
EOSINOPHIL # BLD AUTO: 0.06 K/UL — SIGNIFICANT CHANGE UP (ref 0–0.5)
EOSINOPHIL NFR BLD AUTO: 1.1 % — SIGNIFICANT CHANGE UP (ref 0–6)
EPI CELLS # UR: SIGNIFICANT CHANGE UP
GLUCOSE SERPL-MCNC: 122 MG/DL — HIGH (ref 70–99)
GLUCOSE UR QL: NEGATIVE MG/DL — SIGNIFICANT CHANGE UP
HCT VFR BLD CALC: 28 % — LOW (ref 34.5–45)
HGB BLD-MCNC: 8.8 G/DL — LOW (ref 11.5–15.5)
IMM GRANULOCYTES NFR BLD AUTO: 0.7 % — SIGNIFICANT CHANGE UP (ref 0–1.5)
KETONES UR-MCNC: ABNORMAL
LEUKOCYTE ESTERASE UR-ACNC: ABNORMAL
LYMPHOCYTES # BLD AUTO: 0.65 K/UL — LOW (ref 1–3.3)
LYMPHOCYTES # BLD AUTO: 11.6 % — LOW (ref 13–44)
MAGNESIUM SERPL-MCNC: 2 MG/DL — SIGNIFICANT CHANGE UP (ref 1.6–2.6)
MCHC RBC-ENTMCNC: 30.9 PG — SIGNIFICANT CHANGE UP (ref 27–34)
MCHC RBC-ENTMCNC: 31.4 GM/DL — LOW (ref 32–36)
MCV RBC AUTO: 98.2 FL — SIGNIFICANT CHANGE UP (ref 80–100)
MONOCYTES # BLD AUTO: 0.26 K/UL — SIGNIFICANT CHANGE UP (ref 0–0.9)
MONOCYTES NFR BLD AUTO: 4.6 % — SIGNIFICANT CHANGE UP (ref 2–14)
NEUTROPHILS # BLD AUTO: 4.58 K/UL — SIGNIFICANT CHANGE UP (ref 1.8–7.4)
NEUTROPHILS NFR BLD AUTO: 81.8 % — HIGH (ref 43–77)
NITRITE UR-MCNC: NEGATIVE — SIGNIFICANT CHANGE UP
PH UR: 5 — SIGNIFICANT CHANGE UP (ref 5–8)
PHOSPHATE SERPL-MCNC: 2.7 MG/DL — SIGNIFICANT CHANGE UP (ref 2.4–4.7)
PLATELET # BLD AUTO: 139 K/UL — LOW (ref 150–400)
POTASSIUM SERPL-MCNC: 4 MMOL/L — SIGNIFICANT CHANGE UP (ref 3.5–5.3)
POTASSIUM SERPL-SCNC: 4 MMOL/L — SIGNIFICANT CHANGE UP (ref 3.5–5.3)
PROT UR-MCNC: 15 MG/DL
RBC # BLD: 2.85 M/UL — LOW (ref 3.8–5.2)
RBC # FLD: 13.6 % — SIGNIFICANT CHANGE UP (ref 10.3–14.5)
RBC CASTS # UR COMP ASSIST: SIGNIFICANT CHANGE UP /HPF (ref 0–4)
SODIUM SERPL-SCNC: 137 MMOL/L — SIGNIFICANT CHANGE UP (ref 135–145)
SODIUM UR-SCNC: <30 MMOL/L — SIGNIFICANT CHANGE UP
SP GR SPEC: 1.02 — SIGNIFICANT CHANGE UP (ref 1.01–1.02)
UROBILINOGEN FLD QL: NEGATIVE MG/DL — SIGNIFICANT CHANGE UP
WBC # BLD: 5.6 K/UL — SIGNIFICANT CHANGE UP (ref 3.8–10.5)
WBC # FLD AUTO: 5.6 K/UL — SIGNIFICANT CHANGE UP (ref 3.8–10.5)
WBC UR QL: SIGNIFICANT CHANGE UP

## 2020-07-29 PROCEDURE — 99233 SBSQ HOSP IP/OBS HIGH 50: CPT

## 2020-07-29 PROCEDURE — 99231 SBSQ HOSP IP/OBS SF/LOW 25: CPT

## 2020-07-29 RX ORDER — SODIUM CHLORIDE 9 MG/ML
500 INJECTION, SOLUTION INTRAVENOUS ONCE
Refills: 0 | Status: COMPLETED | OUTPATIENT
Start: 2020-07-29 | End: 2020-07-29

## 2020-07-29 RX ADMIN — Medication 975 MILLIGRAM(S): at 00:15

## 2020-07-29 RX ADMIN — HEPARIN SODIUM 5000 UNIT(S): 5000 INJECTION INTRAVENOUS; SUBCUTANEOUS at 21:41

## 2020-07-29 RX ADMIN — SODIUM CHLORIDE 500 MILLILITER(S): 9 INJECTION, SOLUTION INTRAVENOUS at 08:45

## 2020-07-29 RX ADMIN — Medication 975 MILLIGRAM(S): at 18:29

## 2020-07-29 RX ADMIN — SENNA PLUS 2 TABLET(S): 8.6 TABLET ORAL at 21:41

## 2020-07-29 RX ADMIN — Medication 975 MILLIGRAM(S): at 06:24

## 2020-07-29 RX ADMIN — POLYETHYLENE GLYCOL 3350 17 GRAM(S): 17 POWDER, FOR SOLUTION ORAL at 12:32

## 2020-07-29 RX ADMIN — CLOPIDOGREL BISULFATE 75 MILLIGRAM(S): 75 TABLET, FILM COATED ORAL at 12:32

## 2020-07-29 RX ADMIN — HEPARIN SODIUM 5000 UNIT(S): 5000 INJECTION INTRAVENOUS; SUBCUTANEOUS at 05:55

## 2020-07-29 RX ADMIN — Medication 50 MILLIGRAM(S): at 05:55

## 2020-07-29 RX ADMIN — Medication 975 MILLIGRAM(S): at 19:20

## 2020-07-29 RX ADMIN — Medication 975 MILLIGRAM(S): at 05:54

## 2020-07-29 RX ADMIN — Medication 975 MILLIGRAM(S): at 12:32

## 2020-07-29 RX ADMIN — Medication 975 MILLIGRAM(S): at 12:40

## 2020-07-29 RX ADMIN — Medication 975 MILLIGRAM(S): at 23:15

## 2020-07-29 RX ADMIN — HEPARIN SODIUM 5000 UNIT(S): 5000 INJECTION INTRAVENOUS; SUBCUTANEOUS at 14:19

## 2020-07-29 RX ADMIN — GABAPENTIN 400 MILLIGRAM(S): 400 CAPSULE ORAL at 21:41

## 2020-07-29 NOTE — PROGRESS NOTE ADULT - SUBJECTIVE AND OBJECTIVE BOX
Patient much more alert.   As per discussion with RN, was able to eat her own lunch and dinner yesterday.  She is verbalizing more and is less fatigued.   Pain appears to be well controlled.     REVIEW OF SYSTEMS  Constitutional - No fever,  +fatigue  Neurological - +loss of strength    FUNCTIONAL PROGRESS    Bed Mobility  Bed Mobility Training Sit-to-Supine: maximum assist (25% patient effort);  1 person assist  Bed Mobility Training Supine-to-Sit: maximum assist (25% patient effort);  1 person assist        VITALS  T(C): 36.5 (20 @ 08:30), Max: 36.9 (20 @ 12:30)  HR: 59 (20 @ 08:30) (59 - 94)  BP: 87/57 (20 @ 08:30) (87/57 - 114/54)  RR: 18 (20 @ 08:30) (18 - 18)  SpO2: 96% (20 @ 08:30) (94% - 96%)  Wt(kg): --    MEDICATIONS   acetaminophen   Tablet .. 975 milliGRAM(s) every 6 hours  ceFAZolin   IVPB 2000 milliGRAM(s) once  clopidogrel Tablet 75 milliGRAM(s) daily  gabapentin 400 milliGRAM(s) at bedtime  heparin   Injectable 5000 Unit(s) every 8 hours  lactated ringers Bolus 500 milliLiter(s) once  lactated ringers. 1000 milliLiter(s) <Continuous>  metoprolol tartrate 50 milliGRAM(s) every 12 hours  ondansetron Injectable 4 milliGRAM(s) every 6 hours PRN  ondansetron Injectable 4 milliGRAM(s) once PRN  polyethylene glycol 3350 17 Gram(s) daily  senna 2 Tablet(s) at bedtime  sodium chloride 0.9% Bolus 500 milliLiter(s) once  traMADol 25 milliGRAM(s) every 6 hours PRN  traMADol 50 milliGRAM(s) every 6 hours PRN      RECENT LABS/IMAGING - Reviewed                        8.8    5.60  )-----------( 139      ( 2020 06:04 )             28.0     07-29    137  |  105  |  39.0<H>  ----------------------------<  122<H>  4.0   |  20.0<L>  |  1.39<H>    Ca    7.7<L>      2020 06:04  Phos  2.7     07-  Mg     2.0     07-      PT/INR - ( 2020 06:21 )   PT: 12.4 sec;   INR: 1.07 ratio           Urinalysis Basic - ( 2020 04:12 )    Color: Yellow / Appearance: Clear / S.020 / pH: x  Gluc: x / Ketone: Trace  / Bili: Negative / Urobili: Negative mg/dL   Blood: x / Protein: 15 mg/dL / Nitrite: Negative   Leuk Esterase: Small / RBC: 0-2 /HPF / WBC 3-5   Sq Epi: x / Non Sq Epi: Occasional / Bacteria: Negative          HEAD CT - No acute findings  CT Chest/AP CT - Comminuted left intertrochanteric fracture with displaced fragments. No acute intrathoracic or abdominal injury visualized.     ---------  PHYSICAL EXAM  Constitutional - NAD, Comfortable  Extremities - Left hip incisio - CDI  Neurologic Exam -                    Cognitive - AAOx self, place, PART of situation -- improved     Motor - hip flexion 1/5 muscle strength, moving all extremities     Sensory - Intact to LT  Psychiatric - Mood WNL, Affect WNL    ASSESSMENT/PLAN  86y Female with functional deficits after left intertrochanteric fracture   Left IT fracture s/p IMN - WBAT   CAD s/p stents - Plavix  HTN - Lopressor  Hypotension - IVF, s/p PRBCs  Pain - Tylenol 975mg Q6 (increased 650mg Q6 ), Gabapentin 400mg QHS (decreased from 300mg TID ), Tramadol   DVT PPX - Heparin, SCDs  Rehab - Patient's fatigue is improved, may demonstrate improvement with therapy today. Have recommended ROS[facility], but if demonstrates a reasonable improvement, can consider AR.     Will continue to follow. Functional progress will determine ongoing rehab dispo recommendations, which may change.    Continue bedside therapy as well as OOB throughout the day with mobilization by staff to maintain cardiopulmonary function and prevention of secondary complications related to debility.

## 2020-07-29 NOTE — PROGRESS NOTE ADULT - SUBJECTIVE AND OBJECTIVE BOX
Pt Name: NAVID LEI    MRN: [id number]      Patient is a 88 year old female status post Left hip IMN on , POD #3. Patient seen and examined at bedside. Patient resting comfortably without complaints. Patient's pain is well controlled with prescribed pain medications. Patient is tolerating PO fluids and diet well. Kerr in place for urinary retention. Denies shortness of breath, chest pain, abdominal pain, numbness, tingling, calf pain.       PAST MEDICAL & SURGICAL HISTORY:  PAST MEDICAL & SURGICAL HISTORY:      Allergies: No Known Allergies      Medications: acetaminophen   Tablet .. 975 milliGRAM(s) Oral every 6 hours  ceFAZolin   IVPB 2000 milliGRAM(s) IV Intermittent once  clopidogrel Tablet 75 milliGRAM(s) Oral daily  gabapentin 400 milliGRAM(s) Oral at bedtime  heparin   Injectable 5000 Unit(s) SubCutaneous every 8 hours  lactated ringers. 1000 milliLiter(s) IV Continuous <Continuous>  metoprolol tartrate 50 milliGRAM(s) Oral every 12 hours  ondansetron Injectable 4 milliGRAM(s) IV Push every 6 hours PRN  ondansetron Injectable 4 milliGRAM(s) IV Push once PRN  polyethylene glycol 3350 17 Gram(s) Oral daily  senna 2 Tablet(s) Oral at bedtime  sodium chloride 0.9% Bolus 500 milliLiter(s) IV Bolus once  traMADol 25 milliGRAM(s) Oral every 6 hours PRN  traMADol 50 milliGRAM(s) Oral every 6 hours PRN                              8.8    5.60  )-----------( 139      ( 2020 06:04 )             28.0     07-    137  |  105  |  39.0<H>  ----------------------------<  122<H>  4.0   |  20.0<L>  |  1.39<H>    Ca    7.7<L>      2020 06:04  Phos  2.7       Mg     2.0             PHYSICAL EXAM:    Vital Signs Last 24 Hrs  T(C): 36.3 (2020 04:33), Max: 36.9 (2020 12:30)  T(F): 97.3 (2020 04:33), Max: 98.4 (2020 12:30)  HR: 75 (2020 04:33) (71 - 94)  BP: 114/54 (2020 04:33) (86/56 - 114/54)  BP(mean): --  RR: 18 (2020 04:33) (17 - 18)  SpO2: 95% (2020 04:33) (94% - 96%)  Daily     Daily Weight in k.2 (2020 04:33)    Appearance: Alert, responsive, in no acute distress.    Left lower extremity: Dressings remain clean, dry and intact. No drainage or discharge noted. Sensation to light touch grossly intact without deficit. Calf soft nontender and compressible. +GC/TA/EHL/FHL. Dorsalis pedis pulse 2+. Capillary refill is less than 3 seconds.       A/P:  Pt is a  88y Female s/p L hip IMN POD #3    PLAN:   * Ortho stable  * DVTP: ASA/Plavix  * Pain control as clinically indicated  * Continue OT/PT  * WBAT LLE  * Continue rest of care as per ACS  * Will sign off

## 2020-07-29 NOTE — PROGRESS NOTE ADULT - SUBJECTIVE AND OBJECTIVE BOX
Patient see and examined this morning  Pain controlled  Tolerating diet  Uprending Cr, BUN/Cr 26, likely prerenal  Kerr in place for urinary retention  Low urine output overnight, HD well (SBP 90's low 100's, baseline), 500 cc bolus given, on IVFs    MEDICATIONS  (STANDING):  acetaminophen   Tablet .. 975 milliGRAM(s) Oral every 6 hours  ceFAZolin   IVPB 2000 milliGRAM(s) IV Intermittent once  clopidogrel Tablet 75 milliGRAM(s) Oral daily  gabapentin 400 milliGRAM(s) Oral at bedtime  heparin   Injectable 5000 Unit(s) SubCutaneous every 8 hours  lactated ringers. 1000 milliLiter(s) (75 mL/Hr) IV Continuous <Continuous>  metoprolol tartrate 50 milliGRAM(s) Oral every 12 hours  polyethylene glycol 3350 17 Gram(s) Oral daily  senna 2 Tablet(s) Oral at bedtime  sodium chloride 0.9% Bolus 500 milliLiter(s) IV Bolus once    MEDICATIONS  (PRN):  ondansetron Injectable 4 milliGRAM(s) IV Push every 6 hours PRN Nausea and/or Vomiting  ondansetron Injectable 4 milliGRAM(s) IV Push once PRN Nausea and/or Vomiting  traMADol 25 milliGRAM(s) Oral every 6 hours PRN Moderate Pain (4 - 6)  traMADol 50 milliGRAM(s) Oral every 6 hours PRN Severe Pain (7 - 10)      Vital Signs Last 24 Hrs  T(C): 36.3 (28 Jul 2020 23:06), Max: 36.9 (28 Jul 2020 12:30)  T(F): 97.3 (28 Jul 2020 23:06), Max: 98.4 (28 Jul 2020 12:30)  HR: 93 (28 Jul 2020 23:06) (71 - 95)  BP: 108/72 (28 Jul 2020 23:06) (86/56 - 111/63)  BP(mean): --  RR: 18 (28 Jul 2020 23:06) (16 - 18)  SpO2: 96% (28 Jul 2020 23:06) (94% - 98%)    Physical Exam:    Gen: NAD  HEENT: PERRLA, EOMI, no drainage or redness  Respiratory: Breath Sounds equal & clear to percussion & auscultation, no accessory muscle use  Cardiovascular: Regular rate & rhythm, normal S1, S2; no murmurs, gallops or rubs; no S3, S4  Gastrointestinal: Soft, non-tender, no hepatosplenomegaly, normal bowel sounds  Extremities: No peripheral edema, No cyanosis, clubbing.  LLE surgical dressing CDI without strikethrough  Vascular: Equal and normal pulses: 2+ peripheral pulses throughout  Neurological: GCS: 14 (E4) A&O x 2 (person, time); no sensory, motor or coordination deficits, normal reflexes  Psychiatric: Normal mood, normal affect  Musculoskeletal: No joint pain, swelling or deformity; no limitation of movement  Skin: No rashes    I&O's Detail    27 Jul 2020 07:01  -  28 Jul 2020 07:00  --------------------------------------------------------  IN:    lactated ringers.: 1500 mL    Packed Red Blood Cells: 312 mL  Total IN: 1812 mL    OUT:  Total OUT: 0 mL    Total NET: 1812 mL      28 Jul 2020 07:01  -  29 Jul 2020 01:56  --------------------------------------------------------  IN:  Total IN: 0 mL    OUT:    Voided: 150 mL  Total OUT: 150 mL    Total NET: -150 mL    LABS:                        9.5    6.72  )-----------( 142      ( 28 Jul 2020 06:21 )             29.7     07-28    140  |  104  |  40.0<H>  ----------------------------<  132<H>  4.0   |  22.0  |  1.52<H>    Ca    8.1<L>      28 Jul 2020 06:21  Phos  3.5     07-28  Mg     2.3     07-28      PT/INR - ( 28 Jul 2020 06:21 )   PT: 12.4 sec;   INR: 1.07 ratio

## 2020-07-29 NOTE — PROGRESS NOTE ADULT - ASSESSMENT
This is a 85yo male sp ground level fall admitted with Left intertrochanteric fracture now sp 7/26/2020 Left IMN  c/b NIKOLAI (BUN/Cr 26), likely prerenal and urinary retention   -Continue diet  -Pain control  -WBAT, continue PT  -Gentle IVF hydration for NIKOLAI, urine studies ordered for AM with blood work  -Keep mazariegos for urinary retention, continue flomax  -Trend H/H  -DVT ppx, ASA/Plavix  -Outpatient follow up for incidental findings of thyroid nodule, rectal thickening on CT (GI f/u for colonoscopy)  Dispo: [facility] once medically cleared for discharge

## 2020-07-29 NOTE — CHART NOTE - NSCHARTNOTEFT_GEN_A_CORE
Incidental findings form discussed with pt's son Jose De Jesus on 7/25. Jose De Jesus was notified about the thyroid nodule finding and rectal mass finding and told to follow up with PCP for thyroid work-up and GI specialist for outpt colonoscopy. Form was signed and copied. Original given to son, and copied placed in pt's chart.    Sawyer Nava MD

## 2020-07-30 ENCOUNTER — TRANSCRIPTION ENCOUNTER (OUTPATIENT)
Age: 85
End: 2020-07-30

## 2020-07-30 VITALS
DIASTOLIC BLOOD PRESSURE: 62 MMHG | OXYGEN SATURATION: 98 % | RESPIRATION RATE: 18 BRPM | TEMPERATURE: 98 F | HEART RATE: 99 BPM | SYSTOLIC BLOOD PRESSURE: 104 MMHG

## 2020-07-30 LAB
ANION GAP SERPL CALC-SCNC: 11 MMOL/L — SIGNIFICANT CHANGE UP (ref 5–17)
BASOPHILS # BLD AUTO: 0.01 K/UL — SIGNIFICANT CHANGE UP (ref 0–0.2)
BASOPHILS NFR BLD AUTO: 0.2 % — SIGNIFICANT CHANGE UP (ref 0–2)
BUN SERPL-MCNC: 37 MG/DL — HIGH (ref 8–20)
CALCIUM SERPL-MCNC: 7.9 MG/DL — LOW (ref 8.6–10.2)
CHLORIDE SERPL-SCNC: 106 MMOL/L — SIGNIFICANT CHANGE UP (ref 98–107)
CO2 SERPL-SCNC: 21 MMOL/L — LOW (ref 22–29)
CREAT SERPL-MCNC: 1.16 MG/DL — SIGNIFICANT CHANGE UP (ref 0.5–1.3)
EOSINOPHIL # BLD AUTO: 0.08 K/UL — SIGNIFICANT CHANGE UP (ref 0–0.5)
EOSINOPHIL NFR BLD AUTO: 1.5 % — SIGNIFICANT CHANGE UP (ref 0–6)
GLUCOSE SERPL-MCNC: 117 MG/DL — HIGH (ref 70–99)
HCT VFR BLD CALC: 27.6 % — LOW (ref 34.5–45)
HGB BLD-MCNC: 8.8 G/DL — LOW (ref 11.5–15.5)
IMM GRANULOCYTES NFR BLD AUTO: 0.8 % — SIGNIFICANT CHANGE UP (ref 0–1.5)
LYMPHOCYTES # BLD AUTO: 0.87 K/UL — LOW (ref 1–3.3)
LYMPHOCYTES # BLD AUTO: 16.8 % — SIGNIFICANT CHANGE UP (ref 13–44)
MAGNESIUM SERPL-MCNC: 2 MG/DL — SIGNIFICANT CHANGE UP (ref 1.6–2.6)
MCHC RBC-ENTMCNC: 31.5 PG — SIGNIFICANT CHANGE UP (ref 27–34)
MCHC RBC-ENTMCNC: 31.9 GM/DL — LOW (ref 32–36)
MCV RBC AUTO: 98.9 FL — SIGNIFICANT CHANGE UP (ref 80–100)
MONOCYTES # BLD AUTO: 0.25 K/UL — SIGNIFICANT CHANGE UP (ref 0–0.9)
MONOCYTES NFR BLD AUTO: 4.8 % — SIGNIFICANT CHANGE UP (ref 2–14)
NEUTROPHILS # BLD AUTO: 3.93 K/UL — SIGNIFICANT CHANGE UP (ref 1.8–7.4)
NEUTROPHILS NFR BLD AUTO: 75.9 % — SIGNIFICANT CHANGE UP (ref 43–77)
PHOSPHATE SERPL-MCNC: 2.9 MG/DL — SIGNIFICANT CHANGE UP (ref 2.4–4.7)
PLATELET # BLD AUTO: 157 K/UL — SIGNIFICANT CHANGE UP (ref 150–400)
POTASSIUM SERPL-MCNC: 4.7 MMOL/L — SIGNIFICANT CHANGE UP (ref 3.5–5.3)
POTASSIUM SERPL-SCNC: 4.7 MMOL/L — SIGNIFICANT CHANGE UP (ref 3.5–5.3)
RBC # BLD: 2.79 M/UL — LOW (ref 3.8–5.2)
RBC # FLD: 14.1 % — SIGNIFICANT CHANGE UP (ref 10.3–14.5)
SODIUM SERPL-SCNC: 138 MMOL/L — SIGNIFICANT CHANGE UP (ref 135–145)
WBC # BLD: 5.18 K/UL — SIGNIFICANT CHANGE UP (ref 3.8–10.5)
WBC # FLD AUTO: 5.18 K/UL — SIGNIFICANT CHANGE UP (ref 3.8–10.5)

## 2020-07-30 PROCEDURE — P9016: CPT

## 2020-07-30 PROCEDURE — 86769 SARS-COV-2 COVID-19 ANTIBODY: CPT

## 2020-07-30 PROCEDURE — 74177 CT ABD & PELVIS W/CONTRAST: CPT

## 2020-07-30 PROCEDURE — 86923 COMPATIBILITY TEST ELECTRIC: CPT

## 2020-07-30 PROCEDURE — 84300 ASSAY OF URINE SODIUM: CPT

## 2020-07-30 PROCEDURE — 85027 COMPLETE CBC AUTOMATED: CPT

## 2020-07-30 PROCEDURE — 84480 ASSAY TRIIODOTHYRONINE (T3): CPT

## 2020-07-30 PROCEDURE — 36415 COLL VENOUS BLD VENIPUNCTURE: CPT

## 2020-07-30 PROCEDURE — 84484 ASSAY OF TROPONIN QUANT: CPT

## 2020-07-30 PROCEDURE — C1713: CPT

## 2020-07-30 PROCEDURE — 83735 ASSAY OF MAGNESIUM: CPT

## 2020-07-30 PROCEDURE — 72170 X-RAY EXAM OF PELVIS: CPT

## 2020-07-30 PROCEDURE — 93005 ELECTROCARDIOGRAM TRACING: CPT

## 2020-07-30 PROCEDURE — 83880 ASSAY OF NATRIURETIC PEPTIDE: CPT

## 2020-07-30 PROCEDURE — 80048 BASIC METABOLIC PNL TOTAL CA: CPT

## 2020-07-30 PROCEDURE — 76000 FLUOROSCOPY <1 HR PHYS/QHP: CPT

## 2020-07-30 PROCEDURE — 99231 SBSQ HOSP IP/OBS SF/LOW 25: CPT

## 2020-07-30 PROCEDURE — 73552 X-RAY EXAM OF FEMUR 2/>: CPT

## 2020-07-30 PROCEDURE — 87635 SARS-COV-2 COVID-19 AMP PRB: CPT

## 2020-07-30 PROCEDURE — 99291 CRITICAL CARE FIRST HOUR: CPT

## 2020-07-30 PROCEDURE — 85610 PROTHROMBIN TIME: CPT

## 2020-07-30 PROCEDURE — 36430 TRANSFUSION BLD/BLD COMPNT: CPT

## 2020-07-30 PROCEDURE — 82378 CARCINOEMBRYONIC ANTIGEN: CPT

## 2020-07-30 PROCEDURE — 70450 CT HEAD/BRAIN W/O DYE: CPT

## 2020-07-30 PROCEDURE — 97530 THERAPEUTIC ACTIVITIES: CPT

## 2020-07-30 PROCEDURE — 84443 ASSAY THYROID STIM HORMONE: CPT

## 2020-07-30 PROCEDURE — 84100 ASSAY OF PHOSPHORUS: CPT

## 2020-07-30 PROCEDURE — 86850 RBC ANTIBODY SCREEN: CPT

## 2020-07-30 PROCEDURE — 84436 ASSAY OF TOTAL THYROXINE: CPT

## 2020-07-30 PROCEDURE — 71260 CT THORAX DX C+: CPT

## 2020-07-30 PROCEDURE — 81001 URINALYSIS AUTO W/SCOPE: CPT

## 2020-07-30 PROCEDURE — 85730 THROMBOPLASTIN TIME PARTIAL: CPT

## 2020-07-30 PROCEDURE — 97163 PT EVAL HIGH COMPLEX 45 MIN: CPT

## 2020-07-30 PROCEDURE — 72125 CT NECK SPINE W/O DYE: CPT

## 2020-07-30 PROCEDURE — 71045 X-RAY EXAM CHEST 1 VIEW: CPT

## 2020-07-30 PROCEDURE — 80307 DRUG TEST PRSMV CHEM ANLYZR: CPT

## 2020-07-30 PROCEDURE — 73502 X-RAY EXAM HIP UNI 2-3 VIEWS: CPT

## 2020-07-30 PROCEDURE — 93306 TTE W/DOPPLER COMPLETE: CPT

## 2020-07-30 PROCEDURE — 86901 BLOOD TYPING SEROLOGIC RH(D): CPT

## 2020-07-30 PROCEDURE — 99233 SBSQ HOSP IP/OBS HIGH 50: CPT | Mod: GC

## 2020-07-30 PROCEDURE — 82570 ASSAY OF URINE CREATININE: CPT

## 2020-07-30 PROCEDURE — 80053 COMPREHEN METABOLIC PANEL: CPT

## 2020-07-30 PROCEDURE — 86900 BLOOD TYPING SEROLOGIC ABO: CPT

## 2020-07-30 RX ORDER — ACETAMINOPHEN 500 MG
3 TABLET ORAL
Qty: 0 | Refills: 0 | DISCHARGE
Start: 2020-07-30

## 2020-07-30 RX ORDER — GABAPENTIN 400 MG/1
1 CAPSULE ORAL
Qty: 0 | Refills: 0 | DISCHARGE
Start: 2020-07-30

## 2020-07-30 RX ORDER — HEPARIN SODIUM 5000 [USP'U]/ML
5000 INJECTION INTRAVENOUS; SUBCUTANEOUS
Qty: 0 | Refills: 0 | DISCHARGE
Start: 2020-07-30

## 2020-07-30 RX ORDER — SENNA PLUS 8.6 MG/1
2 TABLET ORAL
Qty: 0 | Refills: 0 | DISCHARGE
Start: 2020-07-30

## 2020-07-30 RX ORDER — POLYETHYLENE GLYCOL 3350 17 G/17G
17 POWDER, FOR SOLUTION ORAL
Qty: 0 | Refills: 0 | DISCHARGE
Start: 2020-07-30

## 2020-07-30 RX ORDER — TAMSULOSIN HYDROCHLORIDE 0.4 MG/1
0.4 CAPSULE ORAL AT BEDTIME
Refills: 0 | Status: DISCONTINUED | OUTPATIENT
Start: 2020-07-30 | End: 2020-07-30

## 2020-07-30 RX ORDER — POTASSIUM CHLORIDE 20 MEQ
1 PACKET (EA) ORAL
Qty: 0 | Refills: 0 | DISCHARGE

## 2020-07-30 RX ORDER — TRAMADOL HYDROCHLORIDE 50 MG/1
0.5 TABLET ORAL
Qty: 0 | Refills: 0 | DISCHARGE
Start: 2020-07-30

## 2020-07-30 RX ORDER — TRAMADOL HYDROCHLORIDE 50 MG/1
1 TABLET ORAL
Qty: 0 | Refills: 0 | DISCHARGE
Start: 2020-07-30

## 2020-07-30 RX ORDER — TAMSULOSIN HYDROCHLORIDE 0.4 MG/1
1 CAPSULE ORAL
Qty: 0 | Refills: 0 | DISCHARGE
Start: 2020-07-30

## 2020-07-30 RX ADMIN — Medication 975 MILLIGRAM(S): at 00:00

## 2020-07-30 RX ADMIN — HEPARIN SODIUM 5000 UNIT(S): 5000 INJECTION INTRAVENOUS; SUBCUTANEOUS at 16:03

## 2020-07-30 RX ADMIN — POLYETHYLENE GLYCOL 3350 17 GRAM(S): 17 POWDER, FOR SOLUTION ORAL at 11:43

## 2020-07-30 RX ADMIN — Medication 50 MILLIGRAM(S): at 05:20

## 2020-07-30 RX ADMIN — CLOPIDOGREL BISULFATE 75 MILLIGRAM(S): 75 TABLET, FILM COATED ORAL at 11:44

## 2020-07-30 RX ADMIN — Medication 975 MILLIGRAM(S): at 11:20

## 2020-07-30 RX ADMIN — HEPARIN SODIUM 5000 UNIT(S): 5000 INJECTION INTRAVENOUS; SUBCUTANEOUS at 05:21

## 2020-07-30 RX ADMIN — Medication 975 MILLIGRAM(S): at 05:20

## 2020-07-30 RX ADMIN — Medication 975 MILLIGRAM(S): at 11:44

## 2020-07-30 NOTE — PROGRESS NOTE ADULT - REASON FOR ADMISSION
ground level fall, obvious deformity in L hip
S/P LEFT HIP IM NAIL
ground level fall, obvious deformity in L hip

## 2020-07-30 NOTE — PROGRESS NOTE ADULT - ASSESSMENT
This is an 86 F s/p GLF with intertrochanteric fx, now s/p LMN with resolving prerenal NIKOLAI and oliguria.  - f/u mazariegos output during AM  - c/w LR 75.  - Ortho: WBAT  - f/u SW about ROSALINO placement  - GI: outpt colonoscopy

## 2020-07-30 NOTE — PROGRESS NOTE ADULT - SUBJECTIVE AND OBJECTIVE BOX
INTERVAL EVENTS  Patient seen and assessed at bedside. No acute events overnight. Patient feels well, denies pain.    FUNCTIONAL PROGRESS  Physical Therapy 20:   Bed Mobility  Bed Mobility Training Sit-to-Supine: maximum assist (25% patient effort);  1 person assist;  verbal cues;  bed rails  Bed Mobility Training Supine-to-Sit: maximum assist (25% patient effort);  1 person assist;  verbal cues;  bed rails  Bed Mobility Training Limitations: decreased ability to use arms for pushing/pulling;  impaired ability to control trunk for mobility;  decreased strength;  pain    Sit-Stand Transfer Training  Transfer Training Sit-to-Stand Transfer: 1 person assist;  verbal cues;  maximum assist (25% patient effort)  Transfer Training Stand-to-Sit Transfer: maximum assist (25% patient effort);  1 person assist;  verbal cues  Sit-to-Stand Transfer Training Transfer Safety Analysis: decreased sequencing ability;  decreased weight-shifting ability;  decreased strength    Gait Training  Gait Training: unable to perform;  unable to perform secondary to fatigue and soiled    REVIEW OF SYSTEMS  Constitutional - No fever,  No fatigue  HEENT - No vertigo, No neck pain  Neurological - No headaches, No memory loss, No loss of strength, No numbness, No tremors  Skin - No rashes, No lesions   Musculoskeletal - No joint pain, No joint swelling, No muscle pain  Psychiatric - No depression, No anxiety    VITALS  T(C): 36.4 (20 @ 08:25), Max: 36.8 (20 @ 04:07)  HR: 56 (20 @ 08:25) (56 - 98)  BP: 106/71 (20 @ 08:25) (90/57 - 115/79)  RR: 18 (20 @ 08:25) (18 - 18)  SpO2: 94% (20 @ 08:25) (94% - 98%)  Wt(kg): --    MEDICATIONS   acetaminophen   Tablet .. 975 milliGRAM(s) every 6 hours  clopidogrel Tablet 75 milliGRAM(s) daily  gabapentin 400 milliGRAM(s) at bedtime  heparin   Injectable 5000 Unit(s) every 8 hours  metoprolol tartrate 50 milliGRAM(s) every 12 hours  ondansetron Injectable 4 milliGRAM(s) every 6 hours PRN  ondansetron Injectable 4 milliGRAM(s) once PRN  polyethylene glycol 3350 17 Gram(s) daily  senna 2 Tablet(s) at bedtime  tamsulosin 0.4 milliGRAM(s) at bedtime  traMADol 25 milliGRAM(s) every 6 hours PRN  traMADol 50 milliGRAM(s) every 6 hours PRN      RECENT LABS/IMAGING  CBC Full  -  ( 2020 05:57 )  WBC Count : 5.18 K/uL  RBC Count : 2.79 M/uL  Hemoglobin : 8.8 g/dL  Hematocrit : 27.6 %  Platelet Count - Automated : 157 K/uL  Mean Cell Volume : 98.9 fl  Mean Cell Hemoglobin : 31.5 pg  Mean Cell Hemoglobin Concentration : 31.9 gm/dL  Auto Neutrophil # : 3.93 K/uL  Auto Lymphocyte # : 0.87 K/uL  Auto Monocyte # : 0.25 K/uL  Auto Eosinophil # : 0.08 K/uL  Auto Basophil # : 0.01 K/uL  Auto Neutrophil % : 75.9 %  Auto Lymphocyte % : 16.8 %  Auto Monocyte % : 4.8 %  Auto Eosinophil % : 1.5 %  Auto Basophil % : 0.2 %    07-30    138  |  106  |  37.0<H>  ----------------------------<  117<H>  4.7   |  21.0<L>  |  1.16    Ca    7.9<L>      2020 05:57  Phos  2.9     07-30  Mg     2.0     07-30      Urinalysis Basic - ( 2020 04:12 )    Color: Yellow / Appearance: Clear / S.020 / pH: x  Gluc: x / Ketone: Trace  / Bili: Negative / Urobili: Negative mg/dL   Blood: x / Protein: 15 mg/dL / Nitrite: Negative   Leuk Esterase: Small / RBC: 0-2 /HPF / WBC 3-5   Sq Epi: x / Non Sq Epi: Occasional / Bacteria: Negative    HEAD CT - No acute findings  CT Chest/AP CT - Comminuted left intertrochanteric fracture with displaced fragments. No acute intrathoracic or abdominal injury visualized.     ---------  PHYSICAL EXAM  Constitutional - NAD, Comfortable  Extremities - No C/C/E, No calf tenderness, hip incision site clean and bandaged  Neurologic Exam -                    Cognitive - AAOx3     Communication - Fluent     Motor - hip flexion 1/5 muscle strength, moving all extremities     Sensory - Intact to LT  Psychiatric - Mood WNL, Affect WNL    ASSESSMENT/PLAN  88y Female with functional deficits after left intertrochanteric fracture   Left IT fracture s/p IMN - WBAT   CAD s/p stents - Plavix  HTN - Lopressor  Hypotension - IVF, s/p PRBCs  Pain - Tylenol 975mg Q6 (increased 650mg Q6 ), Gabapentin 400mg QHS (decreased from 300mg TID ), Tramadol   DVT PPX - Heparin, SCDs  Rehab - Patient's fatigue is improved, may demonstrate improvement with therapy today, pending PT re-evaluation today. Recommend ROSALINO, but if demonstrates a reasonable improvement, can consider AR.     Will continue to follow. Functional progress will determine ongoing rehab dispo recommendations, which may change.    Continue bedside therapy as well as OOB throughout the day with mobilization by staff to maintain cardiopulmonary function and prevention of secondary complications related to debility. INTERVAL EVENTS  Patient seen and assessed at bedside. No acute events overnight. Patient feels well, denies pain.    FUNCTIONAL PROGRESS  Physical Therapy 20:   Bed Mobility  Bed Mobility Training Sit-to-Supine: maximum assist (25% patient effort);  1 person assist;  verbal cues;  bed rails  Bed Mobility Training Supine-to-Sit: maximum assist (25% patient effort);  1 person assist;  verbal cues;  bed rails  Bed Mobility Training Limitations: decreased ability to use arms for pushing/pulling;  impaired ability to control trunk for mobility;  decreased strength;  pain    Sit-Stand Transfer Training  Transfer Training Sit-to-Stand Transfer: 1 person assist;  verbal cues;  maximum assist (25% patient effort)  Transfer Training Stand-to-Sit Transfer: maximum assist (25% patient effort);  1 person assist;  verbal cues  Sit-to-Stand Transfer Training Transfer Safety Analysis: decreased sequencing ability;  decreased weight-shifting ability;  decreased strength    Gait Training  Gait Training: unable to perform;  unable to perform secondary to fatigue and soiled    REVIEW OF SYSTEMS  Constitutional - No fever,  No fatigue  HEENT - No vertigo, No neck pain  Neurological - No headaches, No memory loss, No loss of strength, No numbness, No tremors  Skin - No rashes, No lesions   Musculoskeletal - No joint pain, No joint swelling, No muscle pain  Psychiatric - No depression, No anxiety    VITALS  T(C): 36.4 (20 @ 08:25), Max: 36.8 (20 @ 04:07)  HR: 56 (20 @ 08:25) (56 - 98)  BP: 106/71 (20 @ 08:25) (90/57 - 115/79)  RR: 18 (20 @ 08:25) (18 - 18)  SpO2: 94% (20 @ 08:25) (94% - 98%)  Wt(kg): --    MEDICATIONS   acetaminophen   Tablet .. 975 milliGRAM(s) every 6 hours  clopidogrel Tablet 75 milliGRAM(s) daily  gabapentin 400 milliGRAM(s) at bedtime  heparin   Injectable 5000 Unit(s) every 8 hours  metoprolol tartrate 50 milliGRAM(s) every 12 hours  ondansetron Injectable 4 milliGRAM(s) every 6 hours PRN  ondansetron Injectable 4 milliGRAM(s) once PRN  polyethylene glycol 3350 17 Gram(s) daily  senna 2 Tablet(s) at bedtime  tamsulosin 0.4 milliGRAM(s) at bedtime  traMADol 25 milliGRAM(s) every 6 hours PRN  traMADol 50 milliGRAM(s) every 6 hours PRN      RECENT LABS/IMAGING  CBC Full  -  ( 2020 05:57 )  WBC Count : 5.18 K/uL  RBC Count : 2.79 M/uL  Hemoglobin : 8.8 g/dL  Hematocrit : 27.6 %  Platelet Count - Automated : 157 K/uL  Mean Cell Volume : 98.9 fl  Mean Cell Hemoglobin : 31.5 pg  Mean Cell Hemoglobin Concentration : 31.9 gm/dL  Auto Neutrophil # : 3.93 K/uL  Auto Lymphocyte # : 0.87 K/uL  Auto Monocyte # : 0.25 K/uL  Auto Eosinophil # : 0.08 K/uL  Auto Basophil # : 0.01 K/uL  Auto Neutrophil % : 75.9 %  Auto Lymphocyte % : 16.8 %  Auto Monocyte % : 4.8 %  Auto Eosinophil % : 1.5 %  Auto Basophil % : 0.2 %    07-30    138  |  106  |  37.0<H>  ----------------------------<  117<H>  4.7   |  21.0<L>  |  1.16    Ca    7.9<L>      2020 05:57  Phos  2.9     07-30  Mg     2.0     07-30      Urinalysis Basic - ( 2020 04:12 )    Color: Yellow / Appearance: Clear / S.020 / pH: x  Gluc: x / Ketone: Trace  / Bili: Negative / Urobili: Negative mg/dL   Blood: x / Protein: 15 mg/dL / Nitrite: Negative   Leuk Esterase: Small / RBC: 0-2 /HPF / WBC 3-5   Sq Epi: x / Non Sq Epi: Occasional / Bacteria: Negative    HEAD CT - No acute findings  CT Chest/AP CT - Comminuted left intertrochanteric fracture with displaced fragments. No acute intrathoracic or abdominal injury visualized.     ---------  PHYSICAL EXAM  Constitutional - NAD, Comfortable  Extremities - No C/C/E, No calf tenderness, hip incision site clean and bandaged  Neurologic Exam -                    Cognitive - AAOx3     Communication - Fluent     Motor - hip flexion 1/5 muscle strength, moving all extremities     Sensory - Intact to LT  Psychiatric - Mood WNL, Affect WNL    ASSESSMENT/PLAN  88y Female with functional deficits after left intertrochanteric fracture   Left IT fracture s/p IMN - WBAT   CAD s/p stents - Plavix  HTN - Lopressor  Hypotension - IVF, s/p PRBCs  Pain - Tylenol 975mg Q6 (increased 650mg Q6 ), Gabapentin 400mg QHS (decreased from 300mg TID ), Tramadol   DVT PPX - Heparin, SCDs  Rehab - Patient's fatigue is improved, may demonstrate improvement with therapy today, pending PT re-evaluation today. Recommend ROSALINO.    Will continue to follow. Functional progress will determine ongoing rehab dispo recommendations, which may change.    Continue bedside therapy as well as OOB throughout the day with mobilization by staff to maintain cardiopulmonary function and prevention of secondary complications related to debility.

## 2020-07-30 NOTE — PROGRESS NOTE ADULT - ATTENDING COMMENTS
I have seen and examined  the patient  hypotensive with low hemoglobin n post op   1 PRBC for acute post op blood loss anemia  PT  resume Plavix  Dispo planning
I have seen and examined the patient  Afebrilew HD normal  awake, and soft  A/P Left intertrochanteric fx, incidental thyroid nodule, colon mass and pneumobilia  Appreciate GI input, unclear history of biliary manipulation   Cardiology to see  TFT   Lovenox,
I have seen and examined the patient  BP and MS improved after 1PRBC  H/H stable  NIKOLAI , cont gentle hydration   DVT chemoprophylaxes  PT/OT  Dispo planning
I have seen and examined the patient  Mgano  resolved.  KVO  ADAT  Lovenox  PT  ADipso planning
I have seen and examined the patient  pain under control  NIKOLAI improving, low urine output, will give 500 ml challenge  H/H stable HD normal  PT  Dipso planning
Patient seen and examined. Case discussed with resident. Agree with recommendations.     Patient has not demonstrated significant improvement over the last few days. Continue to recommend ROSALINO.    Discussed with rehab team.
Patient seen and examined. Case discussed with resident. Agree with recommendations.   Pain medications modified as patient's progress/function is limited by fatigue.     Discussed case with PT as well. All in agreement that patient's current progress/function is significantly limited. More appropriate for ROSALINO.
The patient was seen and examined  No new problems  For ORIF today  DVT prophylaxis
Above noted.   Pt did well post-op. She is euvolemic.  There is no acute cardiac issues  cont with home meds.  Please call if needed  Thank you

## 2020-07-30 NOTE — PROGRESS NOTE ADULT - SUBJECTIVE AND OBJECTIVE BOX
This is an 86 F s/p GLF with intertrochanteric fx, now s/p LMN. Pt seen and examined at bedside sleeping comfortably. Per nurse, federica DIAZies n/v/f/SOB/CP. Pt is with mazariegos and was found to be oliguric during the day. Pt was given boluses and has no produced 550ml urine during this pm-am shift.     MEDICATIONS  (STANDING):  acetaminophen   Tablet .. 975 milliGRAM(s) Oral every 6 hours  ceFAZolin   IVPB 2000 milliGRAM(s) IV Intermittent once  clopidogrel Tablet 75 milliGRAM(s) Oral daily  gabapentin 400 milliGRAM(s) Oral at bedtime  heparin   Injectable 5000 Unit(s) SubCutaneous every 8 hours  lactated ringers. 1000 milliLiter(s) (75 mL/Hr) IV Continuous <Continuous>  metoprolol tartrate 50 milliGRAM(s) Oral every 12 hours  polyethylene glycol 3350 17 Gram(s) Oral daily  senna 2 Tablet(s) Oral at bedtime  sodium chloride 0.9% Bolus 500 milliLiter(s) IV Bolus once    MEDICATIONS  (PRN):  ondansetron Injectable 4 milliGRAM(s) IV Push every 6 hours PRN Nausea and/or Vomiting  ondansetron Injectable 4 milliGRAM(s) IV Push once PRN Nausea and/or Vomiting  traMADol 25 milliGRAM(s) Oral every 6 hours PRN Moderate Pain (4 - 6)  traMADol 50 milliGRAM(s) Oral every 6 hours PRN Severe Pain (7 - 10)      Vital Signs Last 24 Hrs  T(C): 36.7 (2020 23:20), Max: 36.7 (2020 23:20)  T(F): 98.1 (2020 23:20), Max: 98.1 (2020 23:20)  HR: 98 (2020 23:20) (59 - 98)  BP: 106/62 (2020 23:20) (87/57 - 123/73)  BP(mean): --  RR: 18 (2020 23:20) (18 - 18)  SpO2: 98% (2020 23:20) (95% - 98%)    Constitutional: NAD, well-groomed, well-developed  HEENT: PERRLA, EOMI, no drainage or redness  Back: Normal spine flexure, No CVA tenderness, No deformity or limitation of movement  Respiratory: Breath Sounds equal & clear to auscultation, no accessory muscle use  Cardiovascular: Regular rate & rhythm, normal S1, S2; no murmurs, gallops or rubs; no S3, S4  Gastrointestinal: Soft, non-tender, no hepatosplenomegaly, normal bowel sounds  Extremities: No peripheral edema, No cyanosis, clubbing , dressings c/d/i  Vascular: Equal and normal pulses: 2+ peripheral pulses throughout  Neurological: GCS:15. A&O x 3; no sensory, motor or coordination deficits, normal reflexes  Psychiatric: Normal mood, normal affect  Musculoskeletal: No joint pain, swelling or deformity; no limitation of movement  Skin: No rashes      I&O's Detail    2020 07:01  -  2020 07:00  --------------------------------------------------------  IN:    lactated ringers.: 975 mL    Sodium Chloride 0.9% IV Bolus: 500 mL  Total IN: 1475 mL    OUT:    Intermittent Catheterization - Urethral: 200 mL    Voided: 150 mL  Total OUT: 350 mL    Total NET: 1125 mL      2020 07:01  -  2020 03:10  --------------------------------------------------------  IN:  Total IN: 0 mL    OUT:    Indwelling Catheter - Urethral: 550 mL  Total OUT: 550 mL    Total NET: -550 mL          LABS:                        8.8    5.60  )-----------( 139      ( 2020 06:04 )             28.0     07-29    137  |  105  |  39.0<H>  ----------------------------<  122<H>  4.0   |  20.0<L>  |  1.39<H>    Ca    7.7<L>      2020 06:04  Phos  2.7     07-29  Mg     2.0     07-29      PT/INR - ( 2020 06:21 )   PT: 12.4 sec;   INR: 1.07 ratio           Urinalysis Basic - ( 2020 04:12 )    Color: Yellow / Appearance: Clear / S.020 / pH: x  Gluc: x / Ketone: Trace  / Bili: Negative / Urobili: Negative mg/dL   Blood: x / Protein: 15 mg/dL / Nitrite: Negative   Leuk Esterase: Small / RBC: 0-2 /HPF / WBC 3-5   Sq Epi: x / Non Sq Epi: Occasional / Bacteria: Negative

## 2020-07-30 NOTE — DISCHARGE NOTE NURSING/CASE MANAGEMENT/SOCIAL WORK - PATIENT PORTAL LINK FT
You can access the FollowMyHealth Patient Portal offered by [facility] by registering at the following website: [email or web address]. By joining [facility]’s FollowMyHealth portal, you will also be able to view your health information using other applications (apps) compatible with our system.

## 2020-07-30 NOTE — PROGRESS NOTE ADULT - NSHPATTENDINGPLANDISCUSS_GEN_ALL_CORE
Patient, ACS team, all questions answered
Trauma Surgery yesterday

## 2020-08-14 PROBLEM — Z00.00 ENCOUNTER FOR PREVENTIVE HEALTH EXAMINATION: Status: ACTIVE | Noted: 2020-08-14

## 2020-08-19 ENCOUNTER — APPOINTMENT (OUTPATIENT)
Dept: ORTHOPEDIC SURGERY | Facility: CLINIC | Age: 85
End: 2020-08-19
Payer: MEDICARE

## 2020-08-19 DIAGNOSIS — S72.142D DISPLACED INTERTROCHANTERIC FRACTURE OF LEFT FEMUR, SUBSEQUENT ENCOUNTER FOR CLOSED FRACTURE WITH ROUTINE HEALING: ICD-10-CM

## 2020-08-19 PROCEDURE — 99024 POSTOP FOLLOW-UP VISIT: CPT

## 2020-08-19 PROCEDURE — 73502 X-RAY EXAM HIP UNI 2-3 VIEWS: CPT | Mod: TC

## 2020-08-19 NOTE — END OF VISIT
[FreeTextEntry3] : I, Chu Hatfield, acted solely as a scribe for Dr. Mario Gomez on this date 08/19/2020.

## 2020-08-19 NOTE — HISTORY OF PRESENT ILLNESS
[Procedure: ___] : status post [unfilled] [Healed] : healed [Xray (Date:___)] : [unfilled] Xray -  [0] : no pain reported [Clean/Dry/Intact] : clean, dry and intact [Swelling] : swollen [Chills] : no chills [Constipation] : no constipation [Diarrhea] : no diarrhea [Dysuria] : no dysuria [Nausea] : no nausea [Fever] : no fever [Erythema] : not erythematous [Vomiting] : no vomiting [Dehiscence] : not dehisced [Discharge] : absent of discharge [de-identified] : S/P Left femur fx repair, DOS: 7/26/20. [de-identified] : Pt is 3 weeks post-op.  patient at [facility]. patient on heparin and plavix for DVTP. patient having PT currently.  no fevers, no chills. Pt denies having pain [de-identified] : Left pelvis exam shows healed incision with no sign of infection  [de-identified] : Activity modifications and restrictions were discussed. Pt should continue PT. Pt understands the importance of prophylaxis for invasive dental procedures. F/u as needed. [de-identified] : 3V xray of the left hip with an intramedullary kassidy fixation in the left femur with no sign of hardware failure.

## 2022-02-23 NOTE — INPATIENT CERTIFICATION FOR MEDICARE PATIENTS - NS ICMP TWO DAYS INPATIENT
Amira notified of Dr. Carbajal's instructions. 2 Pm telephone appointment today is cancelled. Amira agreed with plans.   Amira had questions about scheduling PHYSICAL THERAPY and ortho appointments and rides. Advised that she call Care coordination at [phone] for assistance. Care Coordination referral is already in her chart.  Frank Verduzco RN     Yes

## 2023-04-12 NOTE — PROGRESS NOTE ADULT - PROVIDER SPECIALTY LIST ADULT
Cardiology
Gastroenterology
Orthopedics
Rehab Medicine
Rehab Medicine
Trauma Surgery
Rehab Medicine
Trauma Surgery
98.5